# Patient Record
Sex: FEMALE | Race: BLACK OR AFRICAN AMERICAN | NOT HISPANIC OR LATINO | Employment: OTHER | URBAN - METROPOLITAN AREA
[De-identification: names, ages, dates, MRNs, and addresses within clinical notes are randomized per-mention and may not be internally consistent; named-entity substitution may affect disease eponyms.]

---

## 2018-07-07 ENCOUNTER — OFFICE VISIT (OUTPATIENT)
Dept: URGENT CARE | Facility: CLINIC | Age: 33
End: 2018-07-07
Payer: MEDICARE

## 2018-07-07 VITALS
TEMPERATURE: 100 F | HEART RATE: 102 BPM | SYSTOLIC BLOOD PRESSURE: 118 MMHG | OXYGEN SATURATION: 99 % | DIASTOLIC BLOOD PRESSURE: 71 MMHG

## 2018-07-07 DIAGNOSIS — L03.317 CELLULITIS OF BUTTOCK: Primary | ICD-10-CM

## 2018-07-07 PROCEDURE — 99203 OFFICE O/P NEW LOW 30 MIN: CPT | Mod: S$GLB,,, | Performed by: PHYSICIAN ASSISTANT

## 2018-07-07 RX ORDER — SULFAMETHOXAZOLE AND TRIMETHOPRIM 800; 160 MG/1; MG/1
1 TABLET ORAL 2 TIMES DAILY
Qty: 20 TABLET | Refills: 0 | Status: SHIPPED | OUTPATIENT
Start: 2018-07-07 | End: 2018-07-17

## 2018-07-07 NOTE — PATIENT INSTRUCTIONS
Discharge Instructions for Cellulitis  You have been diagnosed with cellulitis. This is an infection in the deepest layer of the skin. In some cases, the infection also affects the muscle. Cellulitis is caused by bacteria. The bacteria can enter the body through broken skin. This can happen with a cut, scratch, animal bite, or an insect bite that has been scratched. You may have been treated in the hospital with antibiotics and fluids. You will likely be given a prescription for antibiotics to take at home. This sheet will help you take care of yourself at home.  Home care  When you are home:  · Take the prescribed antibiotic medicine you are given as directed until it is gone. Take it even if you feel better. It treats the infection and stops it from returning. Not taking all the medicine can make future infections hard to treat.  · Keep the infected area clean.  · When possible, raise the infected area above the level of your heart. This helps keep swelling down.  · Talk with your healthcare provider if you are in pain. Ask what kind of over-the-counter medicine you can take for pain.  · Apply clean bandages as advised.  · Take your temperature once a day for a week.  · Wash your hands often to prevent spreading the infection.  In the future, wash your hands before and after you touch cuts, scratches, or bandages. This will help prevent infection.   When to call your healthcare provider  Call your healthcare provider immediately if you have any of the following:  · Difficulty or pain when moving the joints above or below the infected area  · Discharge or pus draining from the area  · Fever of 100.4°F (38°C) or higher, or as directed by your healthcare provider  · Pain that gets worse in or around the infected   · Redness that gets worse in or around the infected area, particularly if the area of redness expands to a wider area  · Shaking chills  · Swelling of the infected area  · Vomiting   Date Last Reviewed:  8/1/2016  © 9275-3187 [employer], [employer]. [address] [place] [address], [place] [address]. All rights reserved. This information is not intended as a substitute for professional medical care. Always follow your healthcare professional's instructions.      Please follow up with your Primary care provider within 2-5 days if your signs and symptoms have not resolved or worsen.     If your condition worsens or fails to improve we recommend that you receive another evaluation at the emergency room immediately or contact your primary medical clinic to discuss your concerns.   You must understand that you have received an Urgent Care treatment only and that you may be released before all of your medical problems are known or treated. You, the patient, will arrange for follow up care as instructed.     RED FLAGS/WARNING SYMPTOMS DISCUSSED WITH PATIENT THAT WOULD WARRANT EMERGENT MEDICAL ATTENTION. PATIENT VERBALIZED UNDERSTANDING.

## 2018-07-07 NOTE — PROGRESS NOTES
Subjective:       Patient ID: Faye Khalil is a 32 y.o. female.    Vitals:  oral temperature is 100.3 °F (37.9 °C). Her blood pressure is 118/71 and her pulse is 102. Her oxygen saturation is 99%.     Chief Complaint: Cellulitis    Edema   This is a new problem. The current episode started in the past 7 days. The problem occurs constantly. The problem has been gradually worsening. Pertinent negatives include no abdominal pain, anorexia, arthralgias, change in bowel habit, chest pain, chills, congestion, coughing, diaphoresis, fatigue, fever, headaches, joint swelling, myalgias, nausea, neck pain, numbness, rash, sore throat, swollen glands, urinary symptoms, vertigo, visual change, vomiting or weakness. The symptoms are aggravated by walking. She has tried nothing for the symptoms.     Review of Systems   Constitution: Negative for chills, diaphoresis, fatigue, fever and weakness.   HENT: Negative for congestion and sore throat.    Eyes: Negative for blurred vision.   Cardiovascular: Negative for chest pain.   Respiratory: Negative for cough and shortness of breath.    Skin: Positive for itching and suspicious lesions. Negative for rash.   Musculoskeletal: Negative for arthralgias, back pain, joint pain, joint swelling, myalgias and neck pain.   Gastrointestinal: Negative for abdominal pain, anorexia, change in bowel habit, diarrhea, nausea and vomiting.   Neurological: Negative for headaches, numbness and vertigo.   Psychiatric/Behavioral: The patient is not nervous/anxious.        Objective:      Physical Exam   Constitutional: She is oriented to person, place, and time. She appears well-developed and well-nourished. She is cooperative.  Non-toxic appearance. She does not appear ill. No distress.   HENT:   Head: Normocephalic and atraumatic. Head is without abrasion, without contusion and without laceration.   Right Ear: Hearing, tympanic membrane, external ear and ear canal normal.   Left Ear: Hearing,  tympanic membrane, external ear and ear canal normal.   Nose: Nose normal. No mucosal edema, rhinorrhea or nasal deformity. No epistaxis. Right sinus exhibits no maxillary sinus tenderness and no frontal sinus tenderness. Left sinus exhibits no maxillary sinus tenderness and no frontal sinus tenderness.   Mouth/Throat: Uvula is midline, oropharynx is clear and moist and mucous membranes are normal. No trismus in the jaw. Normal dentition. No uvula swelling. No posterior oropharyngeal erythema.   Eyes: Conjunctivae, EOM and lids are normal. Pupils are equal, round, and reactive to light. Right eye exhibits no discharge. Left eye exhibits no discharge. No scleral icterus.   Sclera clear bilat   Neck: Trachea normal, normal range of motion, full passive range of motion without pain and phonation normal. Neck supple.   Cardiovascular: Normal rate, regular rhythm, normal heart sounds, intact distal pulses and normal pulses.    Pulmonary/Chest: Effort normal and breath sounds normal. No stridor. No respiratory distress.   Abdominal: Soft. Normal appearance and bowel sounds are normal. She exhibits no distension, no pulsatile midline mass and no mass. There is no tenderness.   Musculoskeletal: Normal range of motion. She exhibits no edema or deformity.   Neurological: She is alert and oriented to person, place, and time. She exhibits normal muscle tone. Coordination normal.   Skin: Skin is warm, dry and intact. Capillary refill takes less than 2 seconds. No abrasion, no bruising, no burn, no ecchymosis, no laceration, no lesion, no petechiae and no rash noted. She is not diaphoretic. There is erythema. No pallor.        Area of erythema and warmth without induration    No oozing, weeping, or drainage.    Psychiatric: She has a normal mood and affect. Her speech is normal and behavior is normal. Judgment and thought content normal. Cognition and memory are normal.   Nursing note and vitals reviewed.      Assessment:        1. Cellulitis of buttock        Plan:         Cellulitis of buttock  -     sulfamethoxazole-trimethoprim 800-160mg (BACTRIM DS) 800-160 mg Tab; Take 1 tablet by mouth 2 (two) times daily. for 10 days  Dispense: 20 tablet; Refill: 0        Discharge Instructions for Cellulitis  You have been diagnosed with cellulitis. This is an infection in the deepest layer of the skin. In some cases, the infection also affects the muscle. Cellulitis is caused by bacteria. The bacteria can enter the body through broken skin. This can happen with a cut, scratch, animal bite, or an insect bite that has been scratched. You may have been treated in the hospital with antibiotics and fluids. You will likely be given a prescription for antibiotics to take at home. This sheet will help you take care of yourself at home.  Home care  When you are home:  · Take the prescribed antibiotic medicine you are given as directed until it is gone. Take it even if you feel better. It treats the infection and stops it from returning. Not taking all the medicine can make future infections hard to treat.  · Keep the infected area clean.  · When possible, raise the infected area above the level of your heart. This helps keep swelling down.  · Talk with your healthcare provider if you are in pain. Ask what kind of over-the-counter medicine you can take for pain.  · Apply clean bandages as advised.  · Take your temperature once a day for a week.  · Wash your hands often to prevent spreading the infection.  In the future, wash your hands before and after you touch cuts, scratches, or bandages. This will help prevent infection.   When to call your healthcare provider  Call your healthcare provider immediately if you have any of the following:  · Difficulty or pain when moving the joints above or below the infected area  · Discharge or pus draining from the area  · Fever of 100.4°F (38°C) or higher, or as directed by your healthcare provider  · Pain that gets  worse in or around the infected   · Redness that gets worse in or around the infected area, particularly if the area of redness expands to a wider area  · Shaking chills  · Swelling of the infected area  · Vomiting   Date Last Reviewed: 8/1/2016  © 5099-1855 [employer], [employer]. [address] [place] [address]. All rights reserved. This information is not intended as a substitute for professional medical care. Always follow your healthcare professional's instructions.      Please follow up with your Primary care provider within 2-5 days if your signs and symptoms have not resolved or worsen.     If your condition worsens or fails to improve we recommend that you receive another evaluation at the emergency room immediately or contact your primary medical clinic to discuss your concerns.   You must understand that you have received an Urgent Care treatment only and that you may be released before all of your medical problems are known or treated. You, the patient, will arrange for follow up care as instructed.     RED FLAGS/WARNING SYMPTOMS DISCUSSED WITH PATIENT THAT WOULD WARRANT EMERGENT MEDICAL ATTENTION. PATIENT VERBALIZED UNDERSTANDING.

## 2018-07-10 ENCOUNTER — TELEPHONE (OUTPATIENT)
Dept: URGENT CARE | Facility: CLINIC | Age: 33
End: 2018-07-10

## 2018-07-10 NOTE — TELEPHONE ENCOUNTER
Called patient, left message informing patient I was calling as a follow up in regards to her visit on 7/7/2018.

## 2019-04-18 ENCOUNTER — HOSPITAL ENCOUNTER (INPATIENT)
Facility: HOSPITAL | Age: 34
LOS: 18 days | Discharge: HOME HEALTH | DRG: 885 | End: 2019-05-06
Attending: PSYCHIATRY & NEUROLOGY | Admitting: PSYCHIATRY & NEUROLOGY
Payer: MEDICARE

## 2019-04-18 DIAGNOSIS — F20.0 SCHIZOPHRENIA, PARANOID, CHRONIC WITH ACUTE EXACERBATION: Primary | ICD-10-CM

## 2019-04-18 DIAGNOSIS — F20.9 SCHIZOPHRENIA, UNSPECIFIED TYPE: ICD-10-CM

## 2019-04-18 DIAGNOSIS — D50.9 MICROCYTIC ANEMIA: ICD-10-CM

## 2019-04-18 DIAGNOSIS — R00.0 TACHYCARDIA: ICD-10-CM

## 2019-04-18 DIAGNOSIS — F20.9 SCHIZOPHRENIA, CHRONIC CONDITION: Chronic | ICD-10-CM

## 2019-04-18 DIAGNOSIS — F20.9 SCHIZOPHRENIA, CHRONIC CONDITION WITH ACUTE EXACERBATION: ICD-10-CM

## 2019-04-18 LAB
CHOLEST SERPL-MCNC: 133 MG/DL (ref 120–199)
CHOLEST/HDLC SERPL: 2.6 {RATIO} (ref 2–5)
FOLATE SERPL-MCNC: 13.8 NG/ML (ref 4–24)
HDLC SERPL-MCNC: 51 MG/DL (ref 40–75)
HDLC SERPL: 38.3 % (ref 20–50)
HIV1+2 IGG SERPL QL IA.RAPID: NEGATIVE
LDLC SERPL CALC-MCNC: 74.8 MG/DL (ref 63–159)
NONHDLC SERPL-MCNC: 82 MG/DL
RPR SER QL: NORMAL
T3 SERPL-MCNC: 69 NG/DL (ref 60–180)
T4 FREE SERPL-MCNC: 1.01 NG/DL (ref 0.71–1.51)
TRIGL SERPL-MCNC: 36 MG/DL (ref 30–150)
VIT B12 SERPL-MCNC: 816 PG/ML (ref 210–950)

## 2019-04-18 PROCEDURE — 93010 EKG 12-LEAD: ICD-10-PCS | Mod: ,,, | Performed by: INTERNAL MEDICINE

## 2019-04-18 PROCEDURE — 93005 ELECTROCARDIOGRAM TRACING: CPT

## 2019-04-18 PROCEDURE — 99223 1ST HOSP IP/OBS HIGH 75: CPT | Mod: AI,GC,, | Performed by: PSYCHIATRY & NEUROLOGY

## 2019-04-18 PROCEDURE — 93010 ELECTROCARDIOGRAM REPORT: CPT | Mod: ,,, | Performed by: INTERNAL MEDICINE

## 2019-04-18 PROCEDURE — 82728 ASSAY OF FERRITIN: CPT

## 2019-04-18 PROCEDURE — 90853 PR GROUP PSYCHOTHERAPY: ICD-10-PCS | Mod: ,,, | Performed by: PSYCHOLOGIST

## 2019-04-18 PROCEDURE — 86703 HIV-1/HIV-2 1 RESULT ANTBDY: CPT

## 2019-04-18 PROCEDURE — 84439 ASSAY OF FREE THYROXINE: CPT

## 2019-04-18 PROCEDURE — 25000003 PHARM REV CODE 250: Performed by: STUDENT IN AN ORGANIZED HEALTH CARE EDUCATION/TRAINING PROGRAM

## 2019-04-18 PROCEDURE — 99223 PR INITIAL HOSPITAL CARE,LEVL III: ICD-10-PCS | Mod: AI,GC,, | Performed by: PSYCHIATRY & NEUROLOGY

## 2019-04-18 PROCEDURE — 82746 ASSAY OF FOLIC ACID SERUM: CPT

## 2019-04-18 PROCEDURE — 82607 VITAMIN B-12: CPT

## 2019-04-18 PROCEDURE — 12400001 HC PSYCH SEMI-PRIVATE ROOM

## 2019-04-18 PROCEDURE — 90853 GROUP PSYCHOTHERAPY: CPT | Mod: ,,, | Performed by: PSYCHOLOGIST

## 2019-04-18 PROCEDURE — 84480 ASSAY TRIIODOTHYRONINE (T3): CPT

## 2019-04-18 PROCEDURE — 86592 SYPHILIS TEST NON-TREP QUAL: CPT

## 2019-04-18 PROCEDURE — 36415 COLL VENOUS BLD VENIPUNCTURE: CPT

## 2019-04-18 PROCEDURE — 83540 ASSAY OF IRON: CPT

## 2019-04-18 PROCEDURE — 80061 LIPID PANEL: CPT

## 2019-04-18 RX ORDER — PALIPERIDONE 3 MG/1
6 TABLET, EXTENDED RELEASE ORAL DAILY
Status: DISCONTINUED | OUTPATIENT
Start: 2019-04-18 | End: 2019-04-19

## 2019-04-18 RX ORDER — FOLIC ACID 1 MG/1
1 TABLET ORAL DAILY
Status: DISCONTINUED | OUTPATIENT
Start: 2019-04-18 | End: 2019-05-06 | Stop reason: HOSPADM

## 2019-04-18 RX ORDER — NICOTINE 7MG/24HR
1 PATCH, TRANSDERMAL 24 HOURS TRANSDERMAL DAILY PRN
Status: DISCONTINUED | OUTPATIENT
Start: 2019-04-18 | End: 2019-05-06 | Stop reason: HOSPADM

## 2019-04-18 RX ORDER — OLANZAPINE 10 MG/2ML
10 INJECTION, POWDER, FOR SOLUTION INTRAMUSCULAR 3 TIMES DAILY PRN
Status: DISCONTINUED | OUTPATIENT
Start: 2019-04-18 | End: 2019-05-06 | Stop reason: HOSPADM

## 2019-04-18 RX ORDER — FERROUS SULFATE 325(65) MG
325 TABLET, DELAYED RELEASE (ENTERIC COATED) ORAL DAILY
Status: DISCONTINUED | OUTPATIENT
Start: 2019-04-18 | End: 2019-05-01

## 2019-04-18 RX ORDER — LOPERAMIDE HYDROCHLORIDE 2 MG/1
2 CAPSULE ORAL
Status: DISCONTINUED | OUTPATIENT
Start: 2019-04-18 | End: 2019-05-06 | Stop reason: HOSPADM

## 2019-04-18 RX ORDER — OLANZAPINE 10 MG/1
10 TABLET ORAL 3 TIMES DAILY PRN
Status: DISCONTINUED | OUTPATIENT
Start: 2019-04-18 | End: 2019-05-06 | Stop reason: HOSPADM

## 2019-04-18 RX ORDER — IBUPROFEN 400 MG/1
400 TABLET ORAL EVERY 6 HOURS PRN
Status: DISCONTINUED | OUTPATIENT
Start: 2019-04-18 | End: 2019-05-06 | Stop reason: HOSPADM

## 2019-04-18 RX ORDER — DOCUSATE SODIUM 100 MG/1
100 CAPSULE, LIQUID FILLED ORAL DAILY PRN
Status: DISCONTINUED | OUTPATIENT
Start: 2019-04-18 | End: 2019-05-06 | Stop reason: HOSPADM

## 2019-04-18 RX ADMIN — THERA TABS 1 TABLET: TAB at 08:04

## 2019-04-18 RX ADMIN — FOLIC ACID 1 MG: 1 TABLET ORAL at 08:04

## 2019-04-18 RX ADMIN — FERROUS SULFATE TAB EC 325 MG (65 MG FE EQUIVALENT) 325 MG: 325 (65 FE) TABLET DELAYED RESPONSE at 08:04

## 2019-04-18 RX ADMIN — OLANZAPINE 10 MG: 10 TABLET, FILM COATED ORAL at 08:04

## 2019-04-18 RX ADMIN — PALIPERIDONE 6 MG: 3 TABLET, EXTENDED RELEASE ORAL at 08:04

## 2019-04-18 NOTE — PROGRESS NOTES
04/18/19 1400   [facility]   Group Name Therapeutic Recreation   Participation Quality Refused;Withdrawn   Insight/Motivation Limited   Affect/Mood Display Irritable;Restless   Cognition Delusional

## 2019-04-18 NOTE — NURSING
Received message from Francisco ([phone])with [facility]. He wanted to discuss pt's admission and their interaction with patient for continuity of care.    Returned call and message left.
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
## 2019-04-18 NOTE — PLAN OF CARE
Problem: Adult Behavioral Health Plan of Care  Goal: Plan of Care Review  Outcome: Ongoing (interventions implemented as appropriate)  Patient up ambulating in hallway. Patient denies any discomfort or distress. Will continue to monitor.
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
## 2019-04-18 NOTE — ASSESSMENT & PLAN NOTE
- will restart supplemental iron  -Ferritin, TIBC, and serum iron; added on to previous labs from 4/18 follow up

## 2019-04-18 NOTE — SUBJECTIVE & OBJECTIVE
"Interval History: Please see hospital course     Family History     Problem Relation (Age of Onset)    Breast cancer Maternal Aunt    Cancer Maternal Uncle        Tobacco Use    Smoking status: Never Smoker   Substance and Sexual Activity    Alcohol use: No    Drug use: Not on file    Sexual activity: Never     Birth control/protection: Surgical     Psychotherapeutics (From admission, onward)    Start     Stop Route Frequency Ordered    04/18/19 0900  paliperidone 24 hr tablet 6 mg      -- Oral Daily 04/18/19 0421 04/18/19 0421  OLANZapine tablet 10 mg  (Olanzapine)      -- Oral 3 times daily PRN 04/18/19 0421 04/18/19 0421  OLANZapine injection 10 mg  (Olanzapine)      -- IM 3 times daily PRN 04/18/19 0421           Review of Systems  Objective:     Vital Signs (Most Recent):  Temp: 98.7 °F (37.1 °C) (04/18/19 0726)  Pulse: 97 (04/18/19 0726)  Resp: 17 (04/18/19 0726)  BP: (!) 116/58 (04/18/19 0726) Vital Signs (24h Range):  Temp:  [98.4 °F (36.9 °C)-99.5 °F (37.5 °C)] 98.7 °F (37.1 °C)  Pulse:  [] 97  Resp:  [16-18] 17  SpO2:  [98 %-100 %] 100 %  BP: (115-124)/(58-86) 116/58     Height: 5' 6" (167.6 cm)  Weight: 63 kg (138 lb 14.2 oz)  Body mass index is 22.42 kg/m².    No intake or output data in the 24 hours ending 04/18/19 1008    Physical Exam   Psychiatric:   Mental Status Exam:    Appearance: dressed in hospital scrubs, hair visibly missing from wig, disheveled, age appropriate   Behavior/Cooperation: psychomotor agitation, tics, cooperative   Speech: Pressured  Language: uses words appropriately  Mood: "I need to go home"  Affect: labile   Thought Process: perseverative, thought blocking   Thought Content: no suicidality, no homicidality, delusions, or paranoia; +RIS  Level of Consciousness: Alert and Oriented x3  Memory:  Recent impaired   Attention/concentration: Easily distracted   Fund of Knowledge: appears adequate  Insight:  Limited  Judgment: Limited          Significant Labs:   Last " 24 Hours:   Recent Lab Results     None          Significant Imaging: None
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
## 2019-04-18 NOTE — PROGRESS NOTES
Group Psychotherapy (PhD/LCSW)    Site: [facility]    Clinical status of patient: Inpatient    Date: 4/18/2019    Group Focus: Life Skills    Length of service: [id number] - 35-40 minutes    Number of patients in attendance: 6    Referred by: Acute Psychiatry Unit Treatment Team    Target symptoms: Psychosis    Patient's response to treatment: Self-disclosure, intrusive     Progress toward goals: Progressing slowly    Interval History: Pt tended to be intrusive with her own issues and needed limits. Comments were tangential and somewhat disorganized.    Diagnosis:  Schizophrenia    Plan: Continue treatment on APU
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
## 2019-04-18 NOTE — SUBJECTIVE & OBJECTIVE
Patient History           Medical as of 4/18/2019     Past Medical History     Diagnosis Date Comments Source    Anemia -- -- Provider    Mental disorder -- -- Provider    Schizophrenia -- -- Provider                  Surgical as of 4/18/2019     Past Surgical History     Procedure Laterality Date Comments Source    TUBAL LIGATION -- -- -- Provider                  Family as of 4/18/2019     Problem Relation Name Age of Onset Comments Source    Breast cancer Maternal Aunt -- -- -- Provider    Cancer Maternal Uncle -- -- -- Provider    Ovarian cancer Neg Hx -- -- -- Provider    Hypertension Neg Hx -- -- -- Provider    Diabetes Neg Hx -- -- -- Provider            Tobacco Use as of 4/18/2019     Smoking Status Smoking Start Date Smoking Quit Date Packs/Day Years Used    Never Smoker -- -- -- --    Types Comments Smokeless Tobacco Status Smokeless Tobacco Quit Date Source    -- -- Unknown -- Provider            Alcohol Use as of 4/18/2019     Alcohol Use Drinks/Week Alcohol/Week Comments Source    No -- -- -- Provider    Frequency Standard Drinks Binge Drinking        -- -- --              Drug Use as of 4/18/2019     Drug Use Types Frequency Comments Source    -- -- -- -- Provider            Sexual Activity as of 4/18/2019     Sexually Active Birth Control Partners Comments Source    Never Surgical -- -- Provider            Activities of Daily Living as of 4/18/2019    None           Social Documentation as of 4/18/2019    None           Occupational as of 4/18/2019    None           Socioeconomic as of 4/18/2019     Marital Status Spouse Name Number of Children Years Education Education Level Preferred Language Ethnicity Race Source    Single -- -- -- -- English /Black Black or  --    Financial Resource Strain Food Insecurity: Worry Food Insecurity: Inability Transportation Needs: Medical Transportation Needs: Non-medical    -- -- -- -- --            Pertinent History     Question  Response Comments    Lives with -- --    Place in Birth Order -- --    Lives in -- --    Number of Siblings -- --    Raised by -- --    Legal Involvement -- --    Childhood Trauma -- --    Criminal History of -- --    Financial Status -- --    Highest Level of Education -- --    Does patient have access to a firearm? -- --     Service -- --    Primary Leisure Activity -- --    Spirituality -- --        Past Medical History:   Diagnosis Date    Anemia     Mental disorder     Schizophrenia      Past Surgical History:   Procedure Laterality Date    LIGATION, FALLOPIAN TUBE, LAPAROSCOPIC Bilateral 5/8/2014    Performed by Lissy [facility]rena [facility] at [facility], [place], operated by [facility] OR    TUBAL LIGATION       Family History     Problem Relation (Age of Onset)    Breast cancer Maternal Aunt    Cancer Maternal Uncle        Tobacco Use    Smoking status: Never Smoker   Substance and Sexual Activity    Alcohol use: No    Drug use: Not on file    Sexual activity: Never     Birth control/protection: Surgical     Review of patient's allergies indicates:  No Known Allergies    No current facility-administered medications on file prior to encounter.      Current Outpatient Medications on File Prior to Encounter   Medication Sig    benztropine (COGENTIN) 1 MG tablet Take 1 mg by mouth every evening.     ferrous sulfate 324 mg (65 mg iron) TbEC Take 325 mg by mouth once daily.    INVEGA SUSTENNA 117 mg/0.75 mL Syrg every 30 days.     naproxen sodium (ANAPROX) 550 MG tablet Take 1 tablet (550 mg total) by mouth every 12 (twelve) hours as needed.    paroxetine (PAXIL) 10 MG tablet Take 10 mg by mouth every evening.      Psychotherapeutics (From admission, onward)    None        Review of Systems  Strengths and Liabilities: Liability: Patient has poor judgment, Liability: Patient is unstable.    Objective:     Vital Signs (Most Recent):    Vital Signs (24h Range):  Temp:  [99.5 °F (37.5 °C)] 99.5 °F (37.5 °C)  Pulse:  [102] 102  Resp:  [16]  16  SpO2:  [98 %] 98 %  BP: (124)/(86) 124/86           There is no height or weight on file to calculate BMI.    No intake or output data in the 24 hours ending 04/18/19 0240    Physical Exam   Psychiatric:   Mental Status Exam:  Appearance:  unremarkable, age appropriate, disheveled  Behavior:       uncooperative, psychomotor agitation, redirectable  Speech/Language:    increased latency of response  Mood: anxious  Affect: Easily distracted   Thought Process:     flight of ideas, poverty of thought, tangential  Thought Content:      normal, no suicidality, no homicidality, delusions, (+) paranoia  Orientation:    grossly intact  Cognition:      grossly intact  Insight:           poor  Judgment:      poor            Significant Labs:   Last 24 Hours:   Recent Lab Results       04/18/19  0124   04/18/19  0123   04/17/19  2341        Benzodiazepines Negative         Methadone metabolites Negative         Phencyclidine Negative         Acetaminophen (Tylenol), Serum     <3.0  Comment:  Toxic Levels:  Adults (4 hr post-ingestion).........>150 ug/mL  Adults (12 hr post-ingestion)........>40 ug/mL  Peds (2 hr post-ingestion, liquid)...>225 ug/mL       Albumin     3.9     Alcohol, Medical, Serum     <10     Alkaline Phosphatase     60     ALT     12     Amphetamine Screen, Ur Negative         Anion Gap     10     Appearance, UA Clear         AST     19     Barbiturate Screen, Ur Negative         Baso #     0.03     Basophil%     0.5     Bilirubin (UA) Negative         BILIRUBIN TOTAL     0.8  Comment:  For infants and newborns, interpretation of results should be based  on gestational age, weight and in agreement with clinical  observations.  Premature Infant recommended reference ranges:  Up to 24 hours.............<8.0 mg/dL  Up to 48 hours............<12.0 mg/dL  3-5 days..................<15.0 mg/dL  6-29 days.................<15.0 mg/dL       BUN, Bld     10     Calcium     9.5     Chloride     104     CO2     24      Cocaine (Metab.) Negative         Color, UA Straw         Creatinine     0.7     Creatinine, Random Ur 34.0  Comment:  The random urine reference ranges provided were established   for 24 hour urine collections.  No reference ranges exist for  random urine specimens.  Correlate clinically.           Differential Method     Automated     eGFR if      >60.0     eGFR if non      >60.0  Comment:  Calculation used to obtain the estimated glomerular filtration  rate (eGFR) is the CKD-EPI equation.        Eos #     0.0     Eosinophil%     0.3     Glucose     83     Glucose, UA Negative         Gran # (ANC)     3.6     Gran%     54.3     Hematocrit     29.5     Hemoglobin     9.5     Immature Grans (Abs)     0.02  Comment:  Mild elevation in immature granulocytes is non specific and   can be seen in a variety of conditions including stress response,   acute inflammation, trauma and pregnancy. Correlation with other   laboratory and clinical findings is essential.       Immature Granulocytes     0.3     Ketones, UA 1+         Leukocytes, UA Negative         Lymph #     2.2     Lymph%     33.0     MCH     26.1     MCHC     32.2     MCV     81     Microscopic Comment SEE COMMENT  Comment:  Other formed elements not mentioned in the report are not   present in the microscopic examination.            Mono #     0.8     Mono%     11.6     MPV     10.7     Nitrite, UA Negative         nRBC     0     Occult Blood UA 1+         Opiate Scrn, Ur Negative         pH, UA 6.0         Platelets     298     Potassium     3.5     Preg Test, Ur   Negative       PROTEIN TOTAL     7.2     Protein, UA Negative  Comment:  Recommend a 24 hour urine protein or a urine   protein/creatinine ratio if globulin induced proteinuria is  clinically suspected.            Acceptable   Yes       RBC     3.64     RBC, UA 7         RDW     14.5     Sodium     138     Specific [place], UA 1.005         Specimen UA  Urine, Clean Catch         Squam Epithel, UA 0         Marijuana (THC) Metabolite Negative         Toxicology Information SEE COMMENT  Comment:  This screen includes the following classes of drugs at the   listed cut-off:  Benzodiazepines                  200 ng/ml  Methadone                        300 ng/ml  Cocaine metabolite               300 ng/ml  Opiates                          300 ng/ml  Barbiturates                     200 ng/ml  Amphetamines                    1000 ng/ml  Marijuana metabs (THC)            50 ng/ml  Phencyclidine (PCP)               25 ng/ml  High concentrations of Diphenhydramine may cross-react with  Phencyclidine PCP screening immunoassay giving a false   positive result.  High concentrations of Methylenedioxymethamphetamine (MDMA aka  Ectasy) and other structurally similar compounds may cross-   react with the Amphetamine/Methamphetamine screening   immunoassay giving a false positive result.  A metabolite of the anti-HIV drug Sustiva () may cause  false positive results in the Marijuana metabolite (THC)   screening assay.  Note: This exception list includes only more common   interferants in toxicology screen testing.  Because of many   cross-reactantspositive results on toxicology drug screens   should be confirmed whenever results do not correlate with   clinical presentation.  This report is intended for use in clinical monitoring and  management of patients. It is not intended for use in   employment related drug testing.  Because of any cross-reactants, positive results on toxicology  drug screens should be confirmed whenever results do not  correlate with clinical presentation.  Presumptive positive results are unconfirmed and may be used   only for medical purposes.  Assay Intended Use: This asasy provides only a preliminary analytical  test result. A more specific alternate chemical method must be used  to obtain a confirmed analytical result. Gas  chromatography/mass  spectrometry (GS/MS)is the preferred confirmatory method. Clinical  consideration and professional judgement should be applied to any   drug of abuse test result, particularly when preliminary results  are used.           TSH     0.713     WBC, UA 0         WBC     6.57           Significant Imaging: None

## 2019-04-18 NOTE — PLAN OF CARE
04/18/19 1300   [facility] Group Therapy   Group Name Medication   Participation Level Appropriate   Participation Quality Cooperative   Insight/Motivation Limited   Affect/Mood Display Appropriate   Cognition Alert

## 2019-04-19 LAB
FERRITIN SERPL-MCNC: 28 NG/ML (ref 20–300)
IRON SERPL-MCNC: 48 UG/DL (ref 30–160)
SATURATED IRON: 12 % (ref 20–50)
TOTAL IRON BINDING CAPACITY: 389 UG/DL (ref 250–450)
TRANSFERRIN SERPL-MCNC: 263 MG/DL (ref 200–375)

## 2019-04-19 PROCEDURE — 12400001 HC PSYCH SEMI-PRIVATE ROOM

## 2019-04-19 PROCEDURE — 99233 PR SUBSEQUENT HOSPITAL CARE,LEVL III: ICD-10-PCS | Mod: ,,, | Performed by: PSYCHIATRY & NEUROLOGY

## 2019-04-19 PROCEDURE — 99233 SBSQ HOSP IP/OBS HIGH 50: CPT | Mod: ,,, | Performed by: PSYCHIATRY & NEUROLOGY

## 2019-04-19 PROCEDURE — 25000003 PHARM REV CODE 250: Performed by: STUDENT IN AN ORGANIZED HEALTH CARE EDUCATION/TRAINING PROGRAM

## 2019-04-19 RX ORDER — PALIPERIDONE 3 MG/1
9 TABLET, EXTENDED RELEASE ORAL DAILY
Status: DISCONTINUED | OUTPATIENT
Start: 2019-04-20 | End: 2019-04-23

## 2019-04-19 RX ADMIN — FERROUS SULFATE TAB EC 325 MG (65 MG FE EQUIVALENT) 325 MG: 325 (65 FE) TABLET DELAYED RESPONSE at 08:04

## 2019-04-19 RX ADMIN — FOLIC ACID 1 MG: 1 TABLET ORAL at 08:04

## 2019-04-19 RX ADMIN — PALIPERIDONE 6 MG: 3 TABLET, EXTENDED RELEASE ORAL at 08:04

## 2019-04-19 RX ADMIN — THERA TABS 1 TABLET: TAB at 08:04

## 2019-04-19 NOTE — PLAN OF CARE
Problem: Adult Behavioral Health Plan of Care  Goal: Plan of Care Review  Outcome: Ongoing (interventions implemented as appropriate)  Patient sitting up in day room. Patient alert and oriented. Patient denies any auditory or visual hallucinations. Patient safety maintained. Will continue to monitor.

## 2019-04-19 NOTE — PROGRESS NOTES
04/19/19 [address] [place] [address] Group Therapy   Group Name Therapeutic Recreation   Participation Quality Requires Prompting   Insight/Motivation Limited;None   Affect/Mood Display Restless;Bizarre   Cognition Delusional;RIS
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
## 2019-04-19 NOTE — PLAN OF CARE
Problem: Adult Behavioral Health Plan of Care  Goal: Plan of Care Review  Outcome: Ongoing (interventions implemented as appropriate)  Observed awake and alert. Affect flat, mood anxious. Pt continues to perseverate about concerns involving her son's living condition. Pt states she must be discharged to find housing for her and her child. Overheard in bathroom RIS. Denied SI/HI. Pt. became agitated earlier in the night stating staff did not need to come into her room that often to check on her. Educated on the importance of providing a safe environment for all patients. Appeared asleep the remainder of the night as noted during frequent rounds. Free from falls/injury. Safety maintained. Continue to monitor.

## 2019-04-20 PROCEDURE — 12400001 HC PSYCH SEMI-PRIVATE ROOM

## 2019-04-20 PROCEDURE — 25000003 PHARM REV CODE 250: Performed by: STUDENT IN AN ORGANIZED HEALTH CARE EDUCATION/TRAINING PROGRAM

## 2019-04-20 PROCEDURE — 99232 PR SUBSEQUENT HOSPITAL CARE,LEVL II: ICD-10-PCS | Mod: ,,, | Performed by: PSYCHIATRY & NEUROLOGY

## 2019-04-20 PROCEDURE — 99232 SBSQ HOSP IP/OBS MODERATE 35: CPT | Mod: ,,, | Performed by: PSYCHIATRY & NEUROLOGY

## 2019-04-20 RX ADMIN — PALIPERIDONE 9 MG: 3 TABLET, EXTENDED RELEASE ORAL at 08:04

## 2019-04-20 RX ADMIN — THERA TABS 1 TABLET: TAB at 08:04

## 2019-04-20 RX ADMIN — FOLIC ACID 1 MG: 1 TABLET ORAL at 08:04

## 2019-04-20 RX ADMIN — FERROUS SULFATE TAB EC 325 MG (65 MG FE EQUIVALENT) 325 MG: 325 (65 FE) TABLET DELAYED RESPONSE at 08:04

## 2019-04-20 NOTE — PLAN OF CARE
"Problem: Cognitive Impairment (Psychotic Signs/Symptoms)  Goal: Improved Thought Clarity and Organization (Psychotic Signs/Symptoms)  Outcome: Ongoing (interventions implemented as appropriate)  Patient thought processes are loose and disorganized.  She reports that she does not have schizophrenia and she does not know why she is here.  Verbalizes that she is worried about her son.  She denies audio hallucinations.  Stating " I have nevers heard voices I don't know why you ask me that:"  Patient States she is sleeping and eating well.      Problem: Sleep Impairment (Psychotic Signs/Symptoms)  Goal: Improved Sleep Hygiene (Psychotic Signs/Symptoms)  Outcome: Ongoing (interventions implemented as appropriate)  Patient reports she is sleeping well.        "

## 2019-04-20 NOTE — PLAN OF CARE
Pt calm and cooperative, although appears disorganized and day-dreamy. Medication compliant. Attended unit activities. Frequently asked this RN about being discharged to be with her special needs son and also requested that this RN help her find housing for herself and son. Pt was instructed to speak with SW re: housing needs. Denies SI/HI/AH/VH. No physical complaints voiced.

## 2019-04-20 NOTE — SUBJECTIVE & OBJECTIVE
"Interval History: Please see hospital course     Family History     Problem Relation (Age of Onset)    Breast cancer Maternal Aunt    Cancer Maternal Uncle        Tobacco Use    Smoking status: Never Smoker   Substance and Sexual Activity    Alcohol use: No    Drug use: Not on file    Sexual activity: Never     Birth control/protection: Surgical     Psychotherapeutics (From admission, onward)    Start     Stop Route Frequency Ordered    04/20/19 0900  paliperidone 24 hr tablet 9 mg      -- Oral Daily 04/19/19 0932    04/18/19 0421  OLANZapine tablet 10 mg  (Olanzapine)      -- Oral 3 times daily PRN 04/18/19 0421    04/18/19 0421  OLANZapine injection 10 mg  (Olanzapine)      -- IM 3 times daily PRN 04/18/19 0421           Review of Systems    Objective:     Vital Signs (Most Recent):  Temp: 98.3 °F (36.8 °C) (04/20/19 0800)  Pulse: 104 (04/20/19 0800)  Resp: 18 (04/20/19 0800)  BP: 114/66 (04/20/19 0800) Vital Signs (24h Range):  Temp:  [98.3 °F (36.8 °C)] 98.3 °F (36.8 °C)  Pulse:  [] 104  Resp:  [18] 18  BP: (114-117)/(58-66) 114/66     Height: 5' 6" (167.6 cm)  Weight: 63 kg (138 lb 14.2 oz)  Body mass index is 22.42 kg/m².    No intake or output data in the 24 hours ending 04/20/19 0953    Physical Exam   Psychiatric:   Mental Status Exam:    Appearance: dressed in street clothes, hair visibly missing from wig, disheveled, age appropriate   Behavior/Cooperation: psychomotor agitation, tics, cooperative   Speech: Pressured  Language: uses words appropriately  Mood: Euthymic  Affect: labile   Thought Process: perseverative, thought blocking   Thought Content: no suicidality, no homicidality; +RIS, Delusions regarding son's safety  Level of Consciousness: Alert and Oriented x3  Memory:  Recent impaired   Attention/concentration: Easily distracted   Fund of Knowledge: appears adequate  Insight:  Poor  Judgment: Limited            Significant Labs:   Last 24 Hours:   Recent Lab Results     None    "       Significant Imaging: None

## 2019-04-20 NOTE — PROGRESS NOTES
04/20/19 0900 04/20/19 1000 04/20/19 1100   [facility] Group Therapy   Group Name Community Reintegration Stress Management Stress Management   Specific Interventions Current Events Coping Skills Training Guided Imagery/Relaxation   Participation Level Active;Supportive;Appropriate Active;Supportive;Appropriate Active;Supportive;Appropriate   Participation Quality Cooperative;Social Cooperative;Social Cooperative   Insight/Motivation Improved Good Good   Affect/Mood Display Appropriate;Bizarre Appropriate Appropriate;Bizarre   Cognition Alert;Oriented Alert;Oriented Alert;Oriented      04/20/19 1300   [facility] Group Therapy   Group Name Therapeutic Recreation   Specific Interventions Skilled Activity Creative Expression   Participation Level Active;Supportive;Appropriate   Participation Quality Cooperative;Social   Insight/Motivation Good   Affect/Mood Display Appropriate;Bizarre   Cognition Alert;Oriented

## 2019-04-20 NOTE — PROGRESS NOTES
04/20/19 1600   [facility] Group Therapy   Group Name Education   Specific Interventions Remotivation   Participation Level Active;Supportive;Appropriate   Participation Quality Cooperative   Insight/Motivation Good   Affect/Mood Display Appropriate   Cognition Alert

## 2019-04-20 NOTE — NURSING
Patient is focused on discharge and seeing her son.  She has poor insight into illness and aftercare need.  Told writer she is not schizophrenic that she does not know why she is here.  Patient denies audio/visual hallucinations.  She report that she takes Invega but does not know why.  She reports she is eating and sleeping well.

## 2019-04-21 PROCEDURE — 25000003 PHARM REV CODE 250: Performed by: STUDENT IN AN ORGANIZED HEALTH CARE EDUCATION/TRAINING PROGRAM

## 2019-04-21 PROCEDURE — 12400001 HC PSYCH SEMI-PRIVATE ROOM

## 2019-04-21 PROCEDURE — 99232 PR SUBSEQUENT HOSPITAL CARE,LEVL II: ICD-10-PCS | Mod: ,,, | Performed by: PSYCHIATRY & NEUROLOGY

## 2019-04-21 PROCEDURE — 99232 SBSQ HOSP IP/OBS MODERATE 35: CPT | Mod: ,,, | Performed by: PSYCHIATRY & NEUROLOGY

## 2019-04-21 RX ADMIN — FERROUS SULFATE TAB EC 325 MG (65 MG FE EQUIVALENT) 325 MG: 325 (65 FE) TABLET DELAYED RESPONSE at 08:04

## 2019-04-21 RX ADMIN — THERA TABS 1 TABLET: TAB at 08:04

## 2019-04-21 RX ADMIN — FOLIC ACID 1 MG: 1 TABLET ORAL at 08:04

## 2019-04-21 RX ADMIN — PALIPERIDONE 9 MG: 3 TABLET, EXTENDED RELEASE ORAL at 08:04
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
## 2019-04-21 NOTE — PROGRESS NOTES
04/21/19 0900 04/21/19 1000 04/21/19 1100   [facility] Group Therapy   Group Name Community Reintegration Education Stress Management   Specific Interventions Current Events Skilled Activity Leisure Education and Awareness Guided Imagery/Relaxation   Participation Level Active;Appropriate Appropriate;Active Appropriate   Participation Quality Cooperative Cooperative Cooperative   Insight/Motivation Good Good Good   Affect/Mood Display Appropriate Appropriate Appropriate   Cognition Alert Alert Alert      04/21/19 1300   [facility] Group Therapy   Group Name Therapeutic Recreation   Specific Interventions Skilled Activity Mild Exercises   Participation Level None   Participation Quality Refused   Insight/Motivation  --    Affect/Mood Display  --    Cognition  --

## 2019-04-21 NOTE — SUBJECTIVE & OBJECTIVE
"Interval History: see hospital course    Family History     Problem Relation (Age of Onset)    Breast cancer Maternal Aunt    Cancer Maternal Uncle        Tobacco Use    Smoking status: Never Smoker   Substance and Sexual Activity    Alcohol use: No    Drug use: Not on file    Sexual activity: Never     Birth control/protection: Surgical     Psychotherapeutics (From admission, onward)    Start     Stop Route Frequency Ordered    04/20/19 0900  paliperidone 24 hr tablet 9 mg      -- Oral Daily 04/19/19 0932    04/18/19 0421  OLANZapine tablet 10 mg  (Olanzapine)      -- Oral 3 times daily PRN 04/18/19 0421    04/18/19 0421  OLANZapine injection 10 mg  (Olanzapine)      -- IM 3 times daily PRN 04/18/19 0421           Review of Systems   Constitutional: Negative for fever.   HENT: Negative for congestion.      Objective:     Vital Signs (Most Recent):  Temp: 98 °F (36.7 °C) (04/21/19 0739)  Pulse: 61 (04/21/19 0739)  Resp: 16 (04/21/19 0739)  BP: 112/71 (04/21/19 0739) Vital Signs (24h Range):  Temp:  [98 °F (36.7 °C)-98.3 °F (36.8 °C)] 98 °F (36.7 °C)  Pulse:  [61-76] 61  Resp:  [16] 16  BP: (105-112)/(63-71) 112/71     Height: 5' 6" (167.6 cm)  Weight: 63 kg (138 lb 14.2 oz)  Body mass index is 22.42 kg/m².    No intake or output data in the 24 hours ending 04/21/19 1340    Physical Exam      CONSTITUTIONAL  General Appearance: unremarkable, age appropriate     MUSCULOSKELETAL  Muscle Strength and Tone: WNL  Abnormal Involuntary Movements: none noted  Gait and Station: normal gait     PSYCHIATRIC   Appearance: unremarkable, age appropriate  Grooming: fair  Arousal: alert  Behavior/Cooperation: cooperative  Speech: normal r/t/v  Language: appropriate   Mood: euthymic  Affect: constricted  Thought Process: linear to questioning  Thought Content: denies SI/HI/AVH  Associations: no loose associations noted  Orientation: grossly intact  Memory: Grossly intact  Fund of Knowledge: appropriate for education " level  Attention Span/Concentration: Normal  Cognition: grossly intact  Insight: fair  Judgment: fair    Significant Labs:   Last 24 Hours:   Recent Lab Results     None          Significant Imaging: I have reviewed all pertinent imaging results/findings within the past 24 hours.

## 2019-04-21 NOTE — PROGRESS NOTES
04/21/19 1400   [facility] Group Therapy   Group Name Self-Esteem   Specific Interventions Guided Imagery/Relaxation   Participation Level Minimal   Participation Quality Cooperative;Withdrawn   Insight/Motivation Limited   Affect/Mood Display Bizarre   Cognition Alert
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
## 2019-04-21 NOTE — PLAN OF CARE
Problem: Adult Behavioral Health Plan of Care  Goal: Plan of Care Review  Outcome: Ongoing (interventions implemented as appropriate)  Observed awake and alert. Affect flat, mood calm. Pt denied SI/HI, observed RIS. Pt's less agitated than noted in previous days. Appeared asleep throughout the night as noted during frequent rounds. Free from falls/injury. Safety maintained. Continue to monitor.
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
## 2019-04-21 NOTE — NURSING
Pt has been up out of her room most of the day. She is attending groups and participates. Pt thoughts are still delusional and disorganized at times.

## 2019-04-22 PROCEDURE — 25000003 PHARM REV CODE 250: Performed by: STUDENT IN AN ORGANIZED HEALTH CARE EDUCATION/TRAINING PROGRAM

## 2019-04-22 PROCEDURE — 12400001 HC PSYCH SEMI-PRIVATE ROOM

## 2019-04-22 PROCEDURE — 99232 PR SUBSEQUENT HOSPITAL CARE,LEVL II: ICD-10-PCS | Mod: GC,,, | Performed by: PSYCHIATRY & NEUROLOGY

## 2019-04-22 PROCEDURE — 99232 SBSQ HOSP IP/OBS MODERATE 35: CPT | Mod: GC,,, | Performed by: PSYCHIATRY & NEUROLOGY

## 2019-04-22 PROCEDURE — 90853 PR GROUP PSYCHOTHERAPY: ICD-10-PCS | Mod: ,,, | Performed by: PSYCHOLOGIST

## 2019-04-22 PROCEDURE — 90853 GROUP PSYCHOTHERAPY: CPT | Mod: ,,, | Performed by: PSYCHOLOGIST

## 2019-04-22 RX ADMIN — FOLIC ACID 1 MG: 1 TABLET ORAL at 08:04

## 2019-04-22 RX ADMIN — THERA TABS 1 TABLET: TAB at 08:04

## 2019-04-22 RX ADMIN — PALIPERIDONE 9 MG: 3 TABLET, EXTENDED RELEASE ORAL at 08:04

## 2019-04-22 RX ADMIN — FERROUS SULFATE TAB EC 325 MG (65 MG FE EQUIVALENT) 325 MG: 325 (65 FE) TABLET DELAYED RESPONSE at 08:04

## 2019-04-22 NOTE — PROGRESS NOTES
04/22/19 0900 04/22/19 1000 04/22/19 1100   [facility] Group Therapy   Group Name Community Reintegration Education Education   Specific Interventions Current Events Relapse Prevention Guided Imagery/Relaxation   Participation Level Appropriate;Minimal Minimal Minimal   Participation Quality Cooperative Requires Prompting Requires Prompting   Insight/Motivation Limited Limited Limited   Affect/Mood Display Blunted Blunted Blunted   Cognition Alert Alert;RIS Alert      04/22/19 1200   [facility] Group Therapy   Group Name Therapeutic Recreation   Specific Interventions Skilled Activity Crafts   Participation Level Minimal   Participation Quality Cooperative   Insight/Motivation Limited   Affect/Mood Display Appropriate;Blunted   Cognition Alert

## 2019-04-22 NOTE — PROGRESS NOTES
Group Psychotherapy (PhD/LCSW)    Site: [facility]    Clinical status of patient: Inpatient    Date: 4/22/2019    Group Focus: Life Skills    Length of service: 29085 - 35-40 minutes    Number of patients in attendance: 9    Referred by: Acute Psychiatry Unit Treatment Team    Target symptoms: Psychosis    Patient's response to treatment: Active Listening; Self-disclosure     Progress toward goals: Progressing slowly    Interval History:  Pt appeared alert and attentive in group. Pt responded briefly, appropriately when prompted in a group discussion of coping with distress and grief over a relationship break-up. .    Diagnosis:  Schizophrenia    Plan: Continue treatment on APU

## 2019-04-22 NOTE — PROGRESS NOTES
04/21/19 2000   [facility] Group Therapy   Group Name Community Reintegration   Specific Interventions Cognitive Stimulation Training   Participation Level Appropriate   Participation Quality Cooperative   Insight/Motivation Good;Improved   Affect/Mood Display Appropriate   Cognition Alert
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
## 2019-04-22 NOTE — PLAN OF CARE
Problem: Adult Behavioral Health Plan of Care  Goal: Plan of Care Review  Outcome: Ongoing (interventions implemented as appropriate)  Patient sitting up in day room. Patient alert and oriented. Patient isolative and guarded. Patient safety maintained. Will continue to monitor.

## 2019-04-22 NOTE — SUBJECTIVE & OBJECTIVE
"Interval History: see hospital course    Family History     Problem Relation (Age of Onset)    Breast cancer Maternal Aunt    Cancer Maternal Uncle        Tobacco Use    Smoking status: Never Smoker   Substance and Sexual Activity    Alcohol use: No    Drug use: Not on file    Sexual activity: Never     Birth control/protection: Surgical     Psychotherapeutics (From admission, onward)    Start     Stop Route Frequency Ordered    04/20/19 0900  paliperidone 24 hr tablet 9 mg      -- Oral Daily 04/19/19 0932    04/18/19 0421  OLANZapine tablet 10 mg  (Olanzapine)      -- Oral 3 times daily PRN 04/18/19 0421    04/18/19 0421  OLANZapine injection 10 mg  (Olanzapine)      -- IM 3 times daily PRN 04/18/19 0421           Review of Systems   Constitutional: Negative for fever.   HENT: Negative for congestion.      Objective:     Vital Signs (Most Recent):  Temp: 97.8 °F (36.6 °C) (04/22/19 0723)  Pulse: 79 (04/22/19 0723)  Resp: 18 (04/22/19 0723)  BP: (!) 109/58 (04/22/19 0723) Vital Signs (24h Range):  Temp:  [97.8 °F (36.6 °C)-98.1 °F (36.7 °C)] 97.8 °F (36.6 °C)  Pulse:  [79-83] 79  Resp:  [16-18] 18  BP: (109-110)/(56-58) 109/58     Height: 5' 6" (167.6 cm)  Weight: 63 kg (138 lb 14.2 oz)  Body mass index is 22.42 kg/m².    No intake or output data in the 24 hours ending 04/22/19 1042    Physical Exam        CONSTITUTIONAL  General Appearance: unremarkable, age appropriate     MUSCULOSKELETAL  Muscle Strength and Tone: WNL  Abnormal Involuntary Movements: none noted  Gait and Station: normal gait     PSYCHIATRIC   Appearance: unremarkable, age appropriate, wearing wig with patches of hair missing to resemble pattern baldness  Arousal: alert  Behavior/Cooperation: cooperative  Speech: normal volume, monotone  Language: English, fluid  Mood: euthymic  Affect: blunted  Thought Process: perseverative  Thought Content: denies SI/HI/AVH, +paranoia  Associations: no loose associations noted  Orientation: grossly " intact  Memory: Grossly intact  Fund of Knowledge: appropriate for education level  Attention Span/Concentration: Normal  Cognition: grossly intact  Insight: fair  Judgment: fair    Significant Labs:   Last 24 Hours:   Recent Lab Results     None          Significant Imaging: I have reviewed all pertinent imaging results/findings within the past 24 hours.

## 2019-04-22 NOTE — PLAN OF CARE
Problem: Adult Behavioral Health Plan of Care  Goal: Plan of Care Review  Outcome: Ongoing (interventions implemented as appropriate)  Observed awake and alert ambulating unit. Affect blunt, mood calm. Denied SI/HI. Thoughts are disorganized, and the pt observed RIS.. No agitation or hostile behavior noted, easily redirectable. Appeared asleep throughout the night as noted during frequent rounds. Free from falls/injury. Safety maintained. Continue to monitor

## 2019-04-23 PROCEDURE — 90853 PR GROUP PSYCHOTHERAPY: ICD-10-PCS | Mod: ,,, | Performed by: PSYCHOLOGIST

## 2019-04-23 PROCEDURE — 99232 SBSQ HOSP IP/OBS MODERATE 35: CPT | Mod: GC,,, | Performed by: PSYCHIATRY & NEUROLOGY

## 2019-04-23 PROCEDURE — 12400001 HC PSYCH SEMI-PRIVATE ROOM

## 2019-04-23 PROCEDURE — 90853 GROUP PSYCHOTHERAPY: CPT | Mod: ,,, | Performed by: PSYCHOLOGIST

## 2019-04-23 PROCEDURE — 99232 PR SUBSEQUENT HOSPITAL CARE,LEVL II: ICD-10-PCS | Mod: GC,,, | Performed by: PSYCHIATRY & NEUROLOGY

## 2019-04-23 PROCEDURE — 63600175 PHARM REV CODE 636 W HCPCS: Mod: JG | Performed by: STUDENT IN AN ORGANIZED HEALTH CARE EDUCATION/TRAINING PROGRAM

## 2019-04-23 PROCEDURE — 25000003 PHARM REV CODE 250: Performed by: STUDENT IN AN ORGANIZED HEALTH CARE EDUCATION/TRAINING PROGRAM

## 2019-04-23 RX ORDER — PALIPERIDONE 3 MG/1
6 TABLET, EXTENDED RELEASE ORAL DAILY
Status: DISCONTINUED | OUTPATIENT
Start: 2019-04-24 | End: 2019-04-30

## 2019-04-23 RX ADMIN — THERA TABS 1 TABLET: TAB at 11:04

## 2019-04-23 RX ADMIN — FERROUS SULFATE TAB EC 325 MG (65 MG FE EQUIVALENT) 325 MG: 325 (65 FE) TABLET DELAYED RESPONSE at 11:04

## 2019-04-23 RX ADMIN — PALIPERIDONE PALMITATE 234 MG: 234 INJECTION INTRAMUSCULAR at 11:04

## 2019-04-23 RX ADMIN — FOLIC ACID 1 MG: 1 TABLET ORAL at 11:04

## 2019-04-23 NOTE — PLAN OF CARE
04/23/19 1500   [facility] Group Therapy   Group Name Medication   Participation Level Appropriate;Sharing   Participation Quality Cooperative   Insight/Motivation Improved   Affect/Mood Display Appropriate
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
## 2019-04-23 NOTE — PROGRESS NOTES
04/22/19 2000   [facility] Group Therapy   Group Name Community Reintegration   Specific Interventions Current Events   Participation Level Minimal   Participation Quality Cooperative   Insight/Motivation Limited   Affect/Mood Display Flat   Cognition Confused

## 2019-04-23 NOTE — PROGRESS NOTES
04/23/19 0900 04/23/19 1000 04/23/19 1100   [facility] Group Therapy   Group Name Community Reintegration Mental Awareness Education   Specific Interventions Current Events Cognitive Stimulation Training Guided Imagery/Relaxation   Participation Level Minimal Minimal Minimal   Participation Quality Cooperative Cooperative Cooperative   Insight/Motivation Limited Limited Limited   Affect/Mood Display Blunted Blunted Blunted   Cognition Alert Alert;RIS Alert      04/23/19 1400   [facility] Group Therapy   Group Name Therapeutic Recreation   Specific Interventions Skilled Activity Mild Exercises   Participation Level Minimal   Participation Quality Cooperative   Insight/Motivation Limited   Affect/Mood Display Appropriate   Cognition RIS

## 2019-04-23 NOTE — PLAN OF CARE
Problem: Adult Behavioral Health Plan of Care  Goal: Plan of Care Review  Outcome: Ongoing (interventions implemented as appropriate)  Compliant with scheduled evening activities.  Attended evening group.   Speech rapid @ times; prn olanzapine offered; patient refused.  No physical complaints.  Retired to bed at appropriate time.  Safety maintained.

## 2019-04-23 NOTE — PLAN OF CARE
04/22/19 1600   [facility] Group Therapy   Group Name Medication   Participation Level Appropriate   Participation Quality Cooperative   Insight/Motivation Limited

## 2019-04-23 NOTE — NURSING
Spoke with [facility]. They offer counseling in the home, but do not have a nurse or MD on staff to give patient monthly injection.  Attempted to call pt's , Nancy Hart at [phone] to set up meeting and to get more information on services.    Will also call [facility] to see if they can assist with resources in the community.

## 2019-04-23 NOTE — NURSING
Appears to have slept approximately 7-8 hours thus far this shift.  Safety maintained throughout shift.  See Observation Checklist.  Will continue to monitor.

## 2019-04-23 NOTE — SUBJECTIVE & OBJECTIVE
"Interval History: see hospital course    Family History     Problem Relation (Age of Onset)    Breast cancer Maternal Aunt    Cancer Maternal Uncle        Tobacco Use    Smoking status: Never Smoker   Substance and Sexual Activity    Alcohol use: No    Drug use: Not on file    Sexual activity: Never     Birth control/protection: Surgical     Psychotherapeutics (From admission, onward)    Start     Stop Route Frequency Ordered    04/24/19 0900  paliperidone 24 hr tablet 6 mg      -- Oral Daily 04/23/19 0955    04/23/19 1100  paliperidone palmitate injection 234 mg      -- IM Once 04/23/19 0955    04/18/19 0421  OLANZapine tablet 10 mg  (Olanzapine)      -- Oral 3 times daily PRN 04/18/19 0421    04/18/19 0421  OLANZapine injection 10 mg  (Olanzapine)      -- IM 3 times daily PRN 04/18/19 0421           Review of Systems   Constitutional: Negative for fever.   HENT: Negative for congestion.      Objective:     Vital Signs (Most Recent):  Temp: 98.1 °F (36.7 °C) (04/23/19 0741)  Pulse: 92 (04/23/19 0741)  Resp: 18 (04/23/19 0741)  BP: (!) 121/59 (04/23/19 0741) Vital Signs (24h Range):  Temp:  [98 °F (36.7 °C)-98.1 °F (36.7 °C)] 98.1 °F (36.7 °C)  Pulse:  [92-97] 92  Resp:  [17-18] 18  BP: (111-121)/(59-60) 121/59     Height: 5' 6" (167.6 cm)  Weight: 63 kg (138 lb 14.2 oz)  Body mass index is 22.42 kg/m².    No intake or output data in the 24 hours ending 04/23/19 0956    Physical Exam        CONSTITUTIONAL  General Appearance: unremarkable, age appropriate     MUSCULOSKELETAL  Muscle Strength and Tone: WNL  Abnormal Involuntary Movements: none noted  Gait and Station: normal gait     PSYCHIATRIC   Appearance: unremarkable, age appropriate, wearing wig with patches of hair missing to resemble pattern baldness  Arousal: alert  Behavior/Cooperation: cooperative  Speech: normal volume, monotone  Language: English, fluid  Mood: "pretty good, better"  Affect: blunted  Thought Process: perseverative  Thought Content: " denies SI/HI/AVH, +paranoia  Associations: no loose associations noted  Orientation: grossly intact  Memory: Grossly intact  Fund of Knowledge: appropriate for education level  Attention Span/Concentration: Normal  Cognition: grossly intact  Insight: fair  Judgment: fair    Significant Labs:   Last 24 Hours:   Recent Lab Results     None        Significant Imaging: I have reviewed all pertinent imaging results/findings within the past 24 hours.

## 2019-04-23 NOTE — PLAN OF CARE
"Pt displayed a blunted affect. Calm and cooperative. Stated that's he is feeling"much better". Pt agreed to taking Invega IM this shift, which was administered by this RN; tolerated well.  Compliant with a ll other medications. Attended unit activities. Denies SI/HI. Denies AH/VH. No physical complaints voiced. NAD observed. Will cont to monitor.  "

## 2019-04-23 NOTE — PROGRESS NOTES
Group Psychotherapy (PhD/LCSW)    Site: [facility]    Clinical status of patient: Inpatient    Date: 4/23/2019    Group Focus: Life Skills    Length of service: 00537 - 35-40 minutes    Number of patients in attendance: 8    Referred by: Acute Psychiatry Unit Treatment Team    Target symptoms: Psychosis    Patient's response to treatment: Active Listening; Self-disclosure     Progress toward goals: Progressing slowly    Interval History: Pt appeared alert and attentive in group. Pt responded appropriately when prompted in a group discussion of strategies for effective anger management. She said that when she gets angry it helps her to take calming breaths and to take a walk.     Diagnosis:  Paranoid Schizophrenia    Plan: Continue treatment on APU

## 2019-04-23 NOTE — NURSING
Called [facility]. Staff who worked with patient, Francisco, is currently in the field. Left me call back information and he will call back once he is back in the office.
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
## 2019-04-24 PROCEDURE — 90853 GROUP PSYCHOTHERAPY: CPT | Mod: ,,, | Performed by: PSYCHOLOGIST

## 2019-04-24 PROCEDURE — 99232 PR SUBSEQUENT HOSPITAL CARE,LEVL II: ICD-10-PCS | Mod: GC,,, | Performed by: PSYCHIATRY & NEUROLOGY

## 2019-04-24 PROCEDURE — 90853 PR GROUP PSYCHOTHERAPY: ICD-10-PCS | Mod: ,,, | Performed by: PSYCHOLOGIST

## 2019-04-24 PROCEDURE — 12400001 HC PSYCH SEMI-PRIVATE ROOM

## 2019-04-24 PROCEDURE — 25000003 PHARM REV CODE 250: Performed by: STUDENT IN AN ORGANIZED HEALTH CARE EDUCATION/TRAINING PROGRAM

## 2019-04-24 PROCEDURE — 97150 GROUP THERAPEUTIC PROCEDURES: CPT

## 2019-04-24 PROCEDURE — 97165 OT EVAL LOW COMPLEX 30 MIN: CPT

## 2019-04-24 PROCEDURE — 99232 SBSQ HOSP IP/OBS MODERATE 35: CPT | Mod: GC,,, | Performed by: PSYCHIATRY & NEUROLOGY

## 2019-04-24 RX ADMIN — THERA TABS 1 TABLET: TAB at 08:04

## 2019-04-24 RX ADMIN — FERROUS SULFATE TAB EC 325 MG (65 MG FE EQUIVALENT) 325 MG: 325 (65 FE) TABLET DELAYED RESPONSE at 08:04

## 2019-04-24 RX ADMIN — PALIPERIDONE 6 MG: 3 TABLET, EXTENDED RELEASE ORAL at 08:04

## 2019-04-24 RX ADMIN — FOLIC ACID 1 MG: 1 TABLET ORAL at 08:04

## 2019-04-24 NOTE — PLAN OF CARE
04/24/19 1500   [facility] Group Therapy   Group Name Medication   Participation Level Appropriate   Participation Quality Cooperative   Insight/Motivation Limited   Affect/Mood Display Appropriate   Cognition Alert

## 2019-04-24 NOTE — NURSING
Called [facility] [phone] to obtain correct number for pt's CM. The number they provided is the number I called. Let them know that this was wrong number. She took my callback number and will have CM call me.

## 2019-04-24 NOTE — SUBJECTIVE & OBJECTIVE
"Interval History: see hospital course    Family History     Problem Relation (Age of Onset)    Breast cancer Maternal Aunt    Cancer Maternal Uncle        Tobacco Use    Smoking status: Never Smoker   Substance and Sexual Activity    Alcohol use: No    Drug use: Not on file    Sexual activity: Never     Birth control/protection: Surgical     Psychotherapeutics (From admission, onward)    Start     Stop Route Frequency Ordered    04/24/19 0900  paliperidone 24 hr tablet 6 mg      -- Oral Daily 04/23/19 0955    04/18/19 0421  OLANZapine tablet 10 mg  (Olanzapine)      -- Oral 3 times daily PRN 04/18/19 0421    04/18/19 0421  OLANZapine injection 10 mg  (Olanzapine)      -- IM 3 times daily PRN 04/18/19 0421           Review of Systems   Constitutional: Negative for fever.   HENT: Negative for congestion.      Objective:     Vital Signs (Most Recent):  Temp: 98.6 °F (37 °C) (04/24/19 0800)  Pulse: 100 (04/24/19 0800)  Resp: 16 (04/24/19 0800)  BP: 114/68 (04/24/19 0800) Vital Signs (24h Range):  Temp:  [98.2 °F (36.8 °C)-98.6 °F (37 °C)] 98.6 °F (37 °C)  Pulse:  [] 100  Resp:  [16] 16  BP: (110-114)/(60-68) 114/68     Height: 5' 6" (167.6 cm)  Weight: 63 kg (138 lb 14.2 oz)  Body mass index is 22.42 kg/m².    No intake or output data in the 24 hours ending 04/24/19 1030    Physical Exam        CONSTITUTIONAL  General Appearance: unremarkable, age appropriate     MUSCULOSKELETAL  Muscle Strength and Tone: WNL  Abnormal Involuntary Movements: none noted  Gait and Station: normal gait     PSYCHIATRIC   Appearance: unremarkable, age appropriate, wearing wig with patches of hair missing to resemble pattern baldness  Arousal: alert  Behavior/Cooperation: cooperative  Speech: normal volume, monotone  Language: English, fluid  Mood: "much better"  Affect: blunted  Thought Process: perseverative  Thought Content: denies SI/HI/AVH, +paranoia  Associations: no loose associations noted  Orientation: grossly " intact  Memory: Grossly intact  Fund of Knowledge: appropriate for education level  Attention Span/Concentration: Normal  Cognition: grossly intact  Insight: fair  Judgment: fair    Significant Labs:   Last 24 Hours:   Recent Lab Results     None        Significant Imaging: I have reviewed all pertinent imaging results/findings within the past 24 hours.

## 2019-04-24 NOTE — PLAN OF CARE
Problem: Adult Behavioral Health Plan of Care  Goal: Plan of Care Review  Outcome: Ongoing (interventions implemented as appropriate)  Observed awake and alert. Affect blunt mood less anxious than noted in previous days. Pt's still disorganized and RIS. Pt observed in her room talking to herself and at times overheard cursing. Pt's easily redirectable. Appeared asleep throughout the night as noted during frequent rounds. Free from falls/injury. Safety maintained. Encourage to seek staff assistance with needs. Continue to monitor.

## 2019-04-24 NOTE — NURSING
"Pt is not eligible for ACT services because she has Medicare A+B.    [facility] hospital transition team will come talk to patient Friday about resources in the community that can help her access mental health care. At this time, pt will likely be followed by [facility].    I spoke with patient and she is on wait list for permanent supportive housing. She is worried that they will call her while she is here and she will miss the call. With her permission, I called [place] supportive housing and let them know she was inpatient and did not have access to her cell phone and explained she was worried about missing the call.    She did give me permission to speak with her brother ONLY to find out if she could return to his home. She did not want me to give him any information about her care. Pt is still paranoid and suspicious and feels like people "lied on her."   "

## 2019-04-24 NOTE — ASSESSMENT & PLAN NOTE
- will continue supplemental iron  -Ferritin, TIBC, and serum iron; added on to previous labs from 4/18 follow up

## 2019-04-24 NOTE — PLAN OF CARE
Pt displayed a blunted affect. Calm and cooperative. Continues to perseverate on asking staff about housing; pt was instructed to discuss this with SW. Medication compliant. Attended unit activities. Denies SI/HI. Denies AH/VH. No physical complaints voiced. NAD observed. Will cont to monitor.
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
## 2019-04-24 NOTE — NURSING
Attempted to call patient's , Melinda MCDUFFIE  Left  and asked her to call me. Did not leave any identifying information. The person who returned my call is not  and this is the wrong number.     Spoke with Francisco from [employer]. He gave me an updated number for patient's brother, Dylon ([phone]).    Stated that her current psychiatry care/med management was through [facility]. He will call me if he can get name of specific provider. [facility] is making a referral for the hospital transition team and they will see patient before d/c. They are able to connect patient with services in the community , such as transportation.    Francisco thinks that patient may currently be a ACT Team client with [facility]. He will find out and call me back. He is unable to find out if patient is followed by [facility] team because he doesn't have a release and [place]. If patient does not have ACT, I will try to call [facility] later today and see if any ACT team Medicare slots are open. Pt would have to agree to ACT team services and any community based services.

## 2019-04-24 NOTE — PROGRESS NOTES
Group Psychotherapy (PhD/LCSW)    Site: [facility]    Clinical status of patient: Inpatient    Date: 4/24/2019    Group Focus: Life Skills    Length of service: 31701 - 35-40 minutes    Number of patients in attendance: 8    Referred by: Acute Psychiatry Unit Treatment Team    Target symptoms: Psychosis    Patient's response to treatment: Active Listening; Self-disclosure     Progress toward goals: Progressing slowly    Interval History: Pt appeared alert and attentive in group. Pt responded appropriately when prompted in a group discussion of strategies for coping with anxiety. Pt said that when she is anxious she needs to withdraw and be by herself in her room to calm down.     Diagnosis:  Paranoid Schizophrenia    Plan: Continue treatment on [facility]
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
## 2019-04-24 NOTE — PROGRESS NOTES
04/24/19 0900 04/24/19 1000 04/24/19 1100   [facility] Group Therapy   Group Name Community Reintegration Education   (pet therapy)   Specific Interventions Current Events Guided Imagery/Relaxation Sensory Stimulation   Participation Level Active;Appropriate Active;Appropriate Active;Appropriate   Participation Quality Cooperative Cooperative Cooperative   Insight/Motivation Good Limited;Improved Limited   Affect/Mood Display Appropriate Bizarre;Appropriate Bizarre;Appropriate   Cognition Alert Alert Alert      04/24/19 1300   [facility] Group Therapy   Group Name Therapeutic Recreation   Specific Interventions Skilled Activity Crafts   Participation Level Active;Appropriate   Participation Quality Cooperative   Insight/Motivation Limited   Affect/Mood Display Bizarre;Appropriate   Cognition Alert
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
## 2019-04-25 PROCEDURE — 99232 PR SUBSEQUENT HOSPITAL CARE,LEVL II: ICD-10-PCS | Mod: ,,, | Performed by: PSYCHIATRY & NEUROLOGY

## 2019-04-25 PROCEDURE — 90853 GROUP PSYCHOTHERAPY: CPT | Mod: ,,, | Performed by: PSYCHOLOGIST

## 2019-04-25 PROCEDURE — 12400001 HC PSYCH SEMI-PRIVATE ROOM

## 2019-04-25 PROCEDURE — 99232 SBSQ HOSP IP/OBS MODERATE 35: CPT | Mod: ,,, | Performed by: PSYCHIATRY & NEUROLOGY

## 2019-04-25 PROCEDURE — 25000003 PHARM REV CODE 250: Performed by: STUDENT IN AN ORGANIZED HEALTH CARE EDUCATION/TRAINING PROGRAM

## 2019-04-25 PROCEDURE — 90853 PR GROUP PSYCHOTHERAPY: ICD-10-PCS | Mod: ,,, | Performed by: PSYCHOLOGIST

## 2019-04-25 RX ADMIN — PALIPERIDONE 6 MG: 3 TABLET, EXTENDED RELEASE ORAL at 09:04

## 2019-04-25 RX ADMIN — FERROUS SULFATE TAB EC 325 MG (65 MG FE EQUIVALENT) 325 MG: 325 (65 FE) TABLET DELAYED RESPONSE at 09:04

## 2019-04-25 RX ADMIN — FOLIC ACID 1 MG: 1 TABLET ORAL at 09:04

## 2019-04-25 RX ADMIN — THERA TABS 1 TABLET: TAB at 09:04
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
## 2019-04-25 NOTE — NURSING
With patient's permission, called pt's brother Dylon ([phone]). Left voice message with my contact information and asked that he call me back. Also left Cruz's info in case he got message after business hours.

## 2019-04-25 NOTE — PLAN OF CARE
04/25/19 1600   [facility] Group Therapy   Group Name Medication   Participation Level Appropriate;Attentive;Sharing   Participation Quality Cooperative   Insight/Motivation Limited   Affect/Mood Display Anxious;Appropriate   Cognition Alert
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
## 2019-04-25 NOTE — PLAN OF CARE
Problem: Adult Behavioral Health Plan of Care  Goal: Plan of Care Review  Outcome: Ongoing (interventions implemented as appropriate)  Patient sitting up in bed in room. Patient isolative and guarded. Patient safety maintained. Will continue to monitor.

## 2019-04-25 NOTE — SUBJECTIVE & OBJECTIVE
"Interval History: see hospital course    Family History     Problem Relation (Age of Onset)    Breast cancer Maternal Aunt    Cancer Maternal Uncle        Tobacco Use    Smoking status: Never Smoker   Substance and Sexual Activity    Alcohol use: No    Drug use: Not on file    Sexual activity: Never     Birth control/protection: Surgical     Psychotherapeutics (From admission, onward)    Start     Stop Route Frequency Ordered    04/24/19 0900  paliperidone 24 hr tablet 6 mg      -- Oral Daily 04/23/19 0955    04/18/19 0421  OLANZapine tablet 10 mg  (Olanzapine)      -- Oral 3 times daily PRN 04/18/19 0421    04/18/19 0421  OLANZapine injection 10 mg  (Olanzapine)      -- IM 3 times daily PRN 04/18/19 0421           Review of Systems   Constitutional: Negative for fever.   HENT: Negative for congestion.      Objective:     Vital Signs (Most Recent):  Temp: 98.1 °F (36.7 °C) (04/24/19 2007)  Pulse: 93 (04/24/19 2007)  Resp: 18 (04/24/19 2007)  BP: 120/60 (04/24/19 2007)  SpO2: 100 % (04/24/19 2007) Vital Signs (24h Range):  Temp:  [98.1 °F (36.7 °C)] 98.1 °F (36.7 °C)  Pulse:  [93] 93  Resp:  [18] 18  SpO2:  [100 %] 100 %  BP: (120)/(60) 120/60     Height: 5' 6" (167.6 cm)  Weight: 63 kg (138 lb 14.2 oz)  Body mass index is 22.42 kg/m².    No intake or output data in the 24 hours ending 04/25/19 0948    Physical Exam        CONSTITUTIONAL  General Appearance: unremarkable, age appropriate     MUSCULOSKELETAL  Muscle Strength and Tone: WNL  Abnormal Involuntary Movements: none noted  Gait and Station: normal gait     PSYCHIATRIC   Appearance: unremarkable, age appropriate, wearing wig with patches of hair missing to resemble pattern baldness  Arousal: alert  Behavior/Cooperation: cooperative  Speech: normal volume, monotone  Language: English, fluid  Mood: "much better"  Affect: blunted  Thought Process: perseverative   Thought Content: denies SI/HI/AVH, +paranoia, perseverates on wanting to be "left alone at " "home"  Associations: no loose associations noted  Orientation: grossly intact  Memory: Grossly intact  Fund of Knowledge: appropriate for education level  Attention Span/Concentration: Normal  Cognition: grossly intact  Insight: fair  Judgment: fair    Significant Labs:   Last 24 Hours:   Recent Lab Results     None        Significant Imaging: I have reviewed all pertinent imaging results/findings within the past 24 hours.  "

## 2019-04-25 NOTE — PROGRESS NOTES
Group Psychotherapy (PhD/LCSW)    Site: [facility]    Clinical status of patient: Inpatient    Date: 4/25/2019    Group Focus: Life Skills    Length of service: 07398 - 35-40 minutes    Number of patients in attendance: 8    Referred by: Acute Psychiatry Unit Treatment Team    Target symptoms: Psychosis    Patient's response to treatment: Active Listening; Self-disclosure     Progress toward goals: Progressing slowly    Interval History:  Pt appeared alert and attentive in group. Pt participated when prompted in a group discussion of how to cope with unfair labeling or criticism from others. Pt tended to focus on the unfair tx by others without looking at her own behavior.     Diagnosis:  Paranoid Schizophrenia    Plan: Continue treatment on APU

## 2019-04-25 NOTE — PROGRESS NOTES
04/25/19 0900 04/25/19 1000 04/25/19 1100   [facility] Group Therapy   Group Name Community Reintegration Education Education   Specific Interventions Current Events Relapse Prevention Guided Imagery/Relaxation   Participation Level Minimal Minimal Minimal   Participation Quality Cooperative Cooperative Cooperative   Insight/Motivation Limited Limited Limited   Affect/Mood Display Appropriate Appropriate Appropriate   Cognition Alert Alert Alert      04/25/19 1400   [facility] Group Therapy   Group Name Therapeutic Recreation   Specific Interventions Skilled Activity Crafts   Participation Level Minimal   Participation Quality Cooperative   Insight/Motivation Limited   Affect/Mood Display Appropriate   Cognition Alert

## 2019-04-26 PROCEDURE — 90853 PR GROUP PSYCHOTHERAPY: ICD-10-PCS | Mod: ,,, | Performed by: PSYCHOLOGIST

## 2019-04-26 PROCEDURE — 12400001 HC PSYCH SEMI-PRIVATE ROOM

## 2019-04-26 PROCEDURE — 99232 SBSQ HOSP IP/OBS MODERATE 35: CPT | Mod: ,,, | Performed by: PSYCHIATRY & NEUROLOGY

## 2019-04-26 PROCEDURE — 25000003 PHARM REV CODE 250: Performed by: STUDENT IN AN ORGANIZED HEALTH CARE EDUCATION/TRAINING PROGRAM

## 2019-04-26 PROCEDURE — 90853 GROUP PSYCHOTHERAPY: CPT | Mod: ,,, | Performed by: PSYCHOLOGIST

## 2019-04-26 PROCEDURE — 99232 PR SUBSEQUENT HOSPITAL CARE,LEVL II: ICD-10-PCS | Mod: ,,, | Performed by: PSYCHIATRY & NEUROLOGY

## 2019-04-26 RX ADMIN — PALIPERIDONE 6 MG: 3 TABLET, EXTENDED RELEASE ORAL at 08:04

## 2019-04-26 RX ADMIN — FERROUS SULFATE TAB EC 325 MG (65 MG FE EQUIVALENT) 325 MG: 325 (65 FE) TABLET DELAYED RESPONSE at 08:04

## 2019-04-26 RX ADMIN — FOLIC ACID 1 MG: 1 TABLET ORAL at 08:04

## 2019-04-26 RX ADMIN — THERA TABS 1 TABLET: TAB at 08:04
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
## 2019-04-26 NOTE — PROGRESS NOTES
Group Psychotherapy (PhD/LCSW)    Site: [facility]    Clinical status of patient: Inpatient    Date: 4/26/2019    Group Focus: Life Skills    Length of service: 60152 - 35-40 minutes    Number of patients in attendance: 8    Referred by: Acute Psychiatry Unit Treatment Team    Target symptoms: Psychosis    Patient's response to treatment: Active Listening; Self-disclosure     Progress toward goals: Progressing slowly    Interval History:  Pt appeared alert and attentive in group. Pt responded actively when prompted in a group discussion of how to recover from the loss of a relationship with a significant other. Pt's comments were related to the subject but tended to be disorganized.     Diagnosis:  Paranoid Schizophrenia    Plan: Continue treatment on APU
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
## 2019-04-26 NOTE — PROGRESS NOTES
04/26/19 0900 04/26/19 1000 04/26/19 1100   [facility] Group Therapy   Group Name Community Reintegration Mental Awareness   (pet therapy)   Specific Interventions Current Events Cognitive Stimulation Training Sensory Stimulation   Participation Level Active;Appropriate Minimal;Active Active;Appropriate   Participation Quality Cooperative Cooperative Cooperative   Insight/Motivation Limited Limited Limited   Affect/Mood Display Appropriate Appropriate Appropriate   Cognition Alert Alert Alert      04/26/19 1300   [facility] Group Therapy   Group Name Therapeutic Recreation   Specific Interventions   (movie social)   Participation Level Minimal   Participation Quality Cooperative   Insight/Motivation Limited   Affect/Mood Display Appropriate   Cognition Alert

## 2019-04-26 NOTE — SUBJECTIVE & OBJECTIVE
"Interval History: see hospital course    Family History     Problem Relation (Age of Onset)    Breast cancer Maternal Aunt    Cancer Maternal Uncle        Tobacco Use    Smoking status: Never Smoker   Substance and Sexual Activity    Alcohol use: No    Drug use: Not on file    Sexual activity: Never     Birth control/protection: Surgical     Psychotherapeutics (From admission, onward)    Start     Stop Route Frequency Ordered    04/28/19 1200  paliperidone palmitate injection 156 mg      -- IM Once 04/25/19 1143    04/24/19 0900  paliperidone 24 hr tablet 6 mg      -- Oral Daily 04/23/19 0955    04/18/19 0421  OLANZapine tablet 10 mg  (Olanzapine)      -- Oral 3 times daily PRN 04/18/19 0421 04/18/19 0421  OLANZapine injection 10 mg  (Olanzapine)      -- IM 3 times daily PRN 04/18/19 0421           Review of Systems   Constitutional: Negative for fever.   HENT: Negative for congestion.      Objective:     Vital Signs (Most Recent):  Temp: 98.1 °F (36.7 °C) (04/26/19 0719)  Pulse: 92 (04/26/19 0719)  Resp: 16 (04/26/19 0719)  BP: (!) 120/57 (04/26/19 0719)  SpO2: 100 % (04/24/19 2007) Vital Signs (24h Range):  Temp:  [98.1 °F (36.7 °C)-98.8 °F (37.1 °C)] 98.1 °F (36.7 °C)  Pulse:  [] 92  Resp:  [16-18] 16  BP: (109-120)/(55-58) 120/57     Height: 5' 6" (167.6 cm)  Weight: 63 kg (138 lb 14.2 oz)  Body mass index is 22.42 kg/m².    No intake or output data in the 24 hours ending 04/26/19 1104    Physical Exam        CONSTITUTIONAL  General Appearance: unremarkable, age appropriate     MUSCULOSKELETAL  Muscle Strength and Tone: WNL  Abnormal Involuntary Movements: none noted  Gait and Station: normal gait     PSYCHIATRIC   Appearance: unremarkable, age appropriate, wearing wig with patches of hair missing to resemble pattern baldness  Arousal: alert  Behavior/Cooperation: cooperative  Speech: normal volume, monotone  Language: English, fluid  Mood: "much better"  Affect: blunted  Thought Process: " perseverative, guarded  Thought Content: denies SI/HI/AVH, +paranoia, perseverates on concerns about her family members  Associations: no loose associations noted  Orientation: grossly intact  Memory: Grossly intact  Fund of Knowledge: appropriate for education level  Attention Span/Concentration: Normal  Cognition: grossly intact  Insight: fair  Judgment: fair    Significant Labs:   Last 24 Hours:   Recent Lab Results     None        Significant Imaging: I have reviewed all pertinent imaging results/findings within the past 24 hours.
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
## 2019-04-26 NOTE — NURSING
The patient appears to have slept 8.0 hours. She has just returned to her room from sitting in the dayroom for a brief time. She was noted RIS while in the dayroom.

## 2019-04-26 NOTE — PLAN OF CARE
Problem: Adult Behavioral Health Plan of Care  Goal: Plan of Care Review  Outcome: Ongoing (interventions implemented as appropriate)  Patient alert and oriented. Patient calm and cooperative and denies any discomfort or distress. Safety maintained. Will continue to monitor.

## 2019-04-27 PROCEDURE — 25000003 PHARM REV CODE 250: Performed by: STUDENT IN AN ORGANIZED HEALTH CARE EDUCATION/TRAINING PROGRAM

## 2019-04-27 PROCEDURE — 12400001 HC PSYCH SEMI-PRIVATE ROOM

## 2019-04-27 PROCEDURE — 99232 SBSQ HOSP IP/OBS MODERATE 35: CPT | Mod: ,,, | Performed by: PSYCHIATRY & NEUROLOGY

## 2019-04-27 PROCEDURE — 99232 PR SUBSEQUENT HOSPITAL CARE,LEVL II: ICD-10-PCS | Mod: ,,, | Performed by: PSYCHIATRY & NEUROLOGY

## 2019-04-27 RX ADMIN — FOLIC ACID 1 MG: 1 TABLET ORAL at 08:04

## 2019-04-27 RX ADMIN — THERA TABS 1 TABLET: TAB at 08:04

## 2019-04-27 RX ADMIN — PALIPERIDONE 6 MG: 3 TABLET, EXTENDED RELEASE ORAL at 08:04

## 2019-04-27 RX ADMIN — FERROUS SULFATE TAB EC 325 MG (65 MG FE EQUIVALENT) 325 MG: 325 (65 FE) TABLET DELAYED RESPONSE at 08:04

## 2019-04-27 NOTE — PLAN OF CARE
Problem: Adult Behavioral Health Plan of Care  Goal: Plan of Care Review  Outcome: Ongoing (interventions implemented as appropriate)  Patient sitting up in day room. Patient calm and cooperative and denies any a/v hallucinations. Safety maintained. Will continue to monitor.

## 2019-04-27 NOTE — PLAN OF CARE
Problem: Adult Behavioral Health Plan of Care  Goal: Plan of Care Review  Outcome: Ongoing (interventions implemented as appropriate)  Compliant with scheduled evening activities.  No SI/HI/AVH, or acting out behaviors.  No physical complaints.  Retired to bed at appropriate time.  Safety maintained.
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
## 2019-04-27 NOTE — SUBJECTIVE & OBJECTIVE
"Interval History: see hospital course    Family History     Problem Relation (Age of Onset)    Breast cancer Maternal Aunt    Cancer Maternal Uncle        Tobacco Use    Smoking status: Never Smoker   Substance and Sexual Activity    Alcohol use: No    Drug use: Not on file    Sexual activity: Never     Birth control/protection: Surgical     Psychotherapeutics (From admission, onward)    Start     Stop Route Frequency Ordered    04/28/19 1200  paliperidone palmitate injection 156 mg      -- IM Once 04/25/19 1143    04/24/19 0900  paliperidone 24 hr tablet 6 mg      -- Oral Daily 04/23/19 0955    04/18/19 0421  OLANZapine tablet 10 mg  (Olanzapine)      -- Oral 3 times daily PRN 04/18/19 0421    04/18/19 0421  OLANZapine injection 10 mg  (Olanzapine)      -- IM 3 times daily PRN 04/18/19 0421           Review of Systems   Constitutional: Negative for fever.   HENT: Negative for congestion.      Objective:     Vital Signs (Most Recent):  Temp: 97.8 °F (36.6 °C) (04/27/19 0744)  Pulse: 103 (04/27/19 0744)  Resp: 17 (04/27/19 0744)  BP: 110/63 (04/27/19 0744)  SpO2: 100 % (04/24/19 2007) Vital Signs (24h Range):  Temp:  [97.8 °F (36.6 °C)-98.2 °F (36.8 °C)] 97.8 °F (36.6 °C)  Pulse:  [] 103  Resp:  [16-17] 17  BP: (110-112)/(51-63) 110/63     Height: 5' 6" (167.6 cm)  Weight: 63 kg (138 lb 14.2 oz)  Body mass index is 22.42 kg/m².    No intake or output data in the 24 hours ending 04/27/19 1006    Physical Exam        CONSTITUTIONAL  General Appearance: unremarkable, age appropriate     MUSCULOSKELETAL  Muscle Strength and Tone: WNL  Abnormal Involuntary Movements: none noted  Gait and Station: normal gait     PSYCHIATRIC   Appearance: unremarkable, age appropriate, wearing wig with patches of hair missing to resemble pattern baldness  Arousal: alert  Behavior/Cooperation: cooperative  Speech: normal volume, monotone, rapid  Language: English, fluid  Mood: "much better"  Affect: blunted  Thought Process: " perseverative, guarded  Thought Content: denies SI/HI/AVH, +paranoia regarding her family members  Associations: no loose associations noted  Orientation: grossly intact  Memory: Grossly intact  Fund of Knowledge: appropriate for education level  Attention Span/Concentration: Normal  Cognition: grossly intact  Insight: fair  Judgment: fair    Significant Labs:   Last 24 Hours:   Recent Lab Results     None        Significant Imaging: I have reviewed all pertinent imaging results/findings within the past 24 hours.

## 2019-04-27 NOTE — PROGRESS NOTES
04/27/19 0900 04/27/19 1000 04/27/19 1100   [facility] Group Therapy   Group Name Community Reintegration Process Process   Specific Interventions Current Events Other (see comments) Other (see comments)   Participation Level Appropriate Appropriate;Sharing Appropriate;Supportive   Participation Quality Cooperative Cooperative Cooperative   Insight/Motivation Improved Improved Improved   Affect/Mood Display Appropriate Appropriate Appropriate   Cognition Alert Alert Alert      04/27/19 1300   [facility] Therapy   Group Name Education   Specific Interventions Skilled Activity Leisure Education and Awareness   Participation Level Appropriate   Participation Quality Cooperative   Insight/Motivation Improved   Affect/Mood Display Appropriate   Cognition Alert

## 2019-04-28 PROCEDURE — 12400001 HC PSYCH SEMI-PRIVATE ROOM

## 2019-04-28 PROCEDURE — 99232 PR SUBSEQUENT HOSPITAL CARE,LEVL II: ICD-10-PCS | Mod: ,,, | Performed by: PSYCHIATRY & NEUROLOGY

## 2019-04-28 PROCEDURE — 25000003 PHARM REV CODE 250: Performed by: STUDENT IN AN ORGANIZED HEALTH CARE EDUCATION/TRAINING PROGRAM

## 2019-04-28 PROCEDURE — 63600175 PHARM REV CODE 636 W HCPCS: Mod: JG | Performed by: STUDENT IN AN ORGANIZED HEALTH CARE EDUCATION/TRAINING PROGRAM

## 2019-04-28 PROCEDURE — 99232 SBSQ HOSP IP/OBS MODERATE 35: CPT | Mod: ,,, | Performed by: PSYCHIATRY & NEUROLOGY

## 2019-04-28 RX ADMIN — FERROUS SULFATE TAB EC 325 MG (65 MG FE EQUIVALENT) 325 MG: 325 (65 FE) TABLET DELAYED RESPONSE at 08:04

## 2019-04-28 RX ADMIN — PALIPERIDONE 6 MG: 3 TABLET, EXTENDED RELEASE ORAL at 08:04

## 2019-04-28 RX ADMIN — FOLIC ACID 1 MG: 1 TABLET ORAL at 08:04

## 2019-04-28 RX ADMIN — THERA TABS 1 TABLET: TAB at 09:04

## 2019-04-28 RX ADMIN — PALIPERIDONE PALMITATE 156 MG: 156 INJECTION INTRAMUSCULAR at 12:04

## 2019-04-28 NOTE — PROGRESS NOTES
04/28/19 0900 04/28/19 1030 04/28/19 1100   [facility] Group Therapy   Group Name Community Reintegration Exercise Stress Management   Specific Interventions Current Events Skilled Activity Mild Exercises Guided Imagery/Relaxation   Participation Level Active Active Appropriate;Attentive   Participation Quality Cooperative Cooperative Cooperative   Insight/Motivation Limited Improved Improved   Affect/Mood Display Bizarre Appropriate Appropriate   Cognition Alert  --  Alert      04/28/19 1300   [facility] Group Therapy   Group Name Therapeutic Recreation   Specific Interventions   (board games)   Participation Level Appropriate   Participation Quality Cooperative   Insight/Motivation Improved   Affect/Mood Display Bizarre   Cognition Alert

## 2019-04-28 NOTE — SUBJECTIVE & OBJECTIVE
"Interval History: see hospital course    Family History     Problem Relation (Age of Onset)    Breast cancer Maternal Aunt    Cancer Maternal Uncle        Tobacco Use    Smoking status: Never Smoker   Substance and Sexual Activity    Alcohol use: No    Drug use: Not on file    Sexual activity: Never     Birth control/protection: Surgical     Psychotherapeutics (From admission, onward)    Start     Stop Route Frequency Ordered    04/28/19 1200  paliperidone palmitate injection 156 mg      -- IM Once 04/25/19 1143    04/24/19 0900  paliperidone 24 hr tablet 6 mg      -- Oral Daily 04/23/19 0955    04/18/19 0421  OLANZapine tablet 10 mg  (Olanzapine)      -- Oral 3 times daily PRN 04/18/19 0421    04/18/19 0421  OLANZapine injection 10 mg  (Olanzapine)      -- IM 3 times daily PRN 04/18/19 0421           Review of Systems   Constitutional: Negative for fever.   HENT: Negative for congestion.      Objective:     Vital Signs (Most Recent):  Temp: 98.2 °F (36.8 °C) (04/28/19 0800)  Pulse: 99 (04/28/19 0800)  Resp: 16 (04/28/19 0800)  BP: 105/71 (04/28/19 0800)  SpO2: 100 % (04/24/19 2007) Vital Signs (24h Range):  Temp:  [98.1 °F (36.7 °C)-98.2 °F (36.8 °C)] 98.2 °F (36.8 °C)  Pulse:  [92-99] 99  Resp:  [16] 16  BP: (105-112)/(61-71) 105/71     Height: 5' 6" (167.6 cm)  Weight: 63 kg (138 lb 14.2 oz)  Body mass index is 22.42 kg/m².    No intake or output data in the 24 hours ending 04/28/19 1004    Physical Exam            PSYCHIATRIC   Appearance: unremarkable, age appropriate, wearing wig with patches of hair missing to resemble pattern baldness  Arousal: alert  Behavior/Cooperation: cooperative  Speech: normal volume, monotone, rapid  Language: English, fluid  Mood: "good"  Affect: full, expansive intermittently  Thought Process: perseverative, evasive at times  Thought Content: denies SI/HI/AVH, +paranoia regarding her family members  Associations: no loose associations noted  Orientation: grossly intact  Memory: " Grossly intact  Fund of Knowledge: appropriate for education level  Attention Span/Concentration: Normal  Cognition: grossly intact  Insight: fair  Judgment: fair    Significant Labs:   Last 24 Hours:   Recent Lab Results     None        Significant Imaging: I have reviewed all pertinent imaging results/findings within the past 24 hours.

## 2019-04-28 NOTE — PLAN OF CARE
Problem: Adult Behavioral Health Plan of Care  Goal: Plan of Care Review  Outcome: Ongoing (interventions implemented as appropriate)  Patient thought processes improving but are still loose and disorganized.  Able to to ADL's.  Sleeping with some interruptions.  Eating well.   Medication compliant.   NO side effects noted.    Goal: Adheres to Safety Considerations for Self and Others  Outcome: Ongoing (interventions implemented as appropriate)  Adheres to safety rules and routines.    Goal: Optimized Coping Skills in Response to Life Stressors  Outcome: Ongoing (interventions implemented as appropriate)  Coping skills are very limited.   Goal: Develops/Participates in Therapeutic [place] to Support Successful Transition  Outcome: Ongoing (interventions implemented as appropriate)  Unable to effectively participate in the therapeutic alliance.     Problem: Cognitive Impairment (Psychotic Signs/Symptoms)  Goal: Improved Thought Clarity and Organization (Psychotic Signs/Symptoms)  Outcome: Ongoing (interventions implemented as appropriate)  Patient cont to be disorganized and is responding to internal stimuli    Problem: Sleep Impairment (Psychotic Signs/Symptoms)  Goal: Improved Sleep Hygiene (Psychotic Signs/Symptoms)  Outcome: Ongoing (interventions implemented as appropriate)  Still awaken in the night and starting talking to herself.

## 2019-04-28 NOTE — NURSING
Patient is up and visible this evening.  Ate snack.  She cont to be delusional and disorganized.  Cont to have paranoid delusions about people lying about her.  Slight decrease in talking to herself.  Focused on discharge and taking care of her son.  INsight poor. Medication complaint.  No acting out behavior.  Patients appetite is good.  Sleeping better. Will still wake up at times and lay in bed talking to herself.

## 2019-04-28 NOTE — PROGRESS NOTES
04/28/19 1030   [facility] Group Therapy   Group Name Exercise   Specific Interventions Skilled Activity Mild Exercises   Participation Level Active   Participation Quality Cooperative   Insight/Motivation Improved   Affect/Mood Display Appropriate

## 2019-04-28 NOTE — PLAN OF CARE
Problem: Adult Behavioral Health Plan of Care  Goal: Plan of Care Review  Outcome: Ongoing (interventions implemented as appropriate)  Pt remains under good behavioral control. She is calm, pleasant on approach. She is heard RIS at times. Pt is not agitated or hostile. She received her invega injection w/o complications. She reports that she prefers po medications. Education provided. No signs of distress noted or reported. Pt appears to become paranoid and suspicious while fixated on personal belongings. Reassurance provided. No agitation or hostility.   Goal: Develops/Participates in Therapeutic [place] to Support Successful Transition  Outcome: Ongoing (interventions implemented as appropriate)  Pt is observed going to groups and activities.     Problem: Cognitive Impairment (Psychotic Signs/Symptoms)  Goal: Improved Thought Clarity and Organization (Psychotic Signs/Symptoms)  Outcome: Ongoing (interventions implemented as appropriate)  Pt remains with poor insight into medical condition. She is focused on discharge.     Problem: Sleep Impairment (Psychotic Signs/Symptoms)  Goal: Improved Sleep Hygiene (Psychotic Signs/Symptoms)  Outcome: Ongoing (interventions implemented as appropriate)  Pt reports sleeping well last night. Pt is observed awake throughout the day. She ins instructed to avoid napping during the day.
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
## 2019-04-29 PROCEDURE — 25000003 PHARM REV CODE 250: Performed by: STUDENT IN AN ORGANIZED HEALTH CARE EDUCATION/TRAINING PROGRAM

## 2019-04-29 PROCEDURE — 12400001 HC PSYCH SEMI-PRIVATE ROOM

## 2019-04-29 PROCEDURE — 90853 PR GROUP PSYCHOTHERAPY: ICD-10-PCS | Mod: ,,, | Performed by: PSYCHOLOGIST

## 2019-04-29 PROCEDURE — 90853 GROUP PSYCHOTHERAPY: CPT | Mod: ,,, | Performed by: PSYCHOLOGIST

## 2019-04-29 PROCEDURE — 99231 PR SUBSEQUENT HOSPITAL CARE,LEVL I: ICD-10-PCS | Mod: ,,, | Performed by: PSYCHIATRY & NEUROLOGY

## 2019-04-29 PROCEDURE — 99231 SBSQ HOSP IP/OBS SF/LOW 25: CPT | Mod: ,,, | Performed by: PSYCHIATRY & NEUROLOGY

## 2019-04-29 RX ADMIN — THERA TABS 1 TABLET: TAB at 08:04

## 2019-04-29 RX ADMIN — FERROUS SULFATE TAB EC 325 MG (65 MG FE EQUIVALENT) 325 MG: 325 (65 FE) TABLET DELAYED RESPONSE at 08:04

## 2019-04-29 RX ADMIN — PALIPERIDONE 6 MG: 3 TABLET, EXTENDED RELEASE ORAL at 08:04

## 2019-04-29 RX ADMIN — FOLIC ACID 1 MG: 1 TABLET ORAL at 08:04

## 2019-04-29 NOTE — PROGRESS NOTES
Group Psychotherapy (PhD/LCSW)    Site: [facility]    Clinical status of patient: Inpatient    Date: 4/29/2019    Group Focus: Life Skills    Length of service: 51795 - 35-40 minutes    Number of patients in attendance: 8    Referred by: Acute Psychiatry Unit Treatment Team    Target symptoms: Psychosis    Patient's response to treatment: Active Listening     Progress toward goals: Progressing slowly    Interval History:  Pt appeared alert and attentive in group. Pt responded briefly, appropriately when prompted in a group discussion of how to cope with negative mood states.     Diagnosis:  Paranoid Schizophrenia    Plan: Continue treatment on [facility]

## 2019-04-29 NOTE — PROGRESS NOTES
04/28/19 2000   [facility] Group Therapy   Group Name Community Reintegration   Specific Interventions Current Events   Participation Level Appropriate   Participation Quality Cooperative   Insight/Motivation Improved   Affect/Mood Display Appropriate   Cognition Alert

## 2019-04-29 NOTE — PLAN OF CARE
Problem: Adult Behavioral Health Plan of Care  Goal: Plan of Care Review  Outcome: Ongoing (interventions implemented as appropriate)  Patient remains loose and disorganized.  Less frequently responding to internal stimuli.  Unable to engage in one to one conversation without interference of delusions.  She is medication complaint with no side effects reported.  Mood is labile.  Patient is easily verbally redirected. No agitation or aggression noted.  Unable to attend most groups due to delusional state.   Goal: Adheres to Safety Considerations for Self and Others  Outcome: Ongoing (interventions implemented as appropriate)  Adheres to safety rules and routines  Goal: Optimized Coping Skills in Response to Life Stressors  Outcome: Ongoing (interventions implemented as appropriate)  Patient unable to ID coping skills available.   Goal: Develops/Participates in Therapeutic [place] to Support Successful Transition  Outcome: Ongoing (interventions implemented as appropriate)  Unable to participate in the therapeutic alliance    Problem: Cognitive Impairment (Psychotic Signs/Symptoms)  Goal: Improved Thought Clarity and Organization (Psychotic Signs/Symptoms)  Outcome: Ongoing (interventions implemented as appropriate)  Patients thought process are disorganized and rambling.  Delusional content noted.      Problem: Sleep Impairment (Psychotic Signs/Symptoms)  Goal: Improved Sleep Hygiene (Psychotic Signs/Symptoms)  Outcome: Ongoing (interventions implemented as appropriate)  Patient unable to sleep at this time.  In the time out room responding to internal stimuli.

## 2019-04-29 NOTE — PROGRESS NOTES
04/29/19 0900 04/29/19 1000 04/29/19 1100   [facility] Group Therapy   Group Name Community Reintegration Education Education   Specific Interventions Current Events Relapse Prevention Guided Imagery/Relaxation   Participation Level Active;Appropriate Minimal;Active Minimal;Active   Participation Quality Cooperative Cooperative Cooperative   Insight/Motivation Good Limited Limited   Affect/Mood Display Blunted;Appropriate Appropriate Appropriate   Cognition Alert Alert Alert      04/29/19 1300   [facility] Group Therapy   Group Name Therapeutic Recreation   Specific Interventions Skilled Activity CraConnected   Participation Level  --    Participation Quality Drowsy;Lack of Interest   Insight/Motivation  --    Affect/Mood Display  --    Cognition  --
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
## 2019-04-29 NOTE — NURSING
Patient is still responding to internal stimuli.  Still verbalizing paranoid ideations about her family and making up lies.  Has agreed for treatment team to contact her father.  Medication compliant with no side effects noted.  Patient had an Invega Sustena injection today.  Shows limited insight into illness and aftercare needs.  Still very focused on discharge.  Still want to be with her son.

## 2019-04-29 NOTE — SUBJECTIVE & OBJECTIVE
"Interval History: see hospital course    Family History     Problem Relation (Age of Onset)    Breast cancer Maternal Aunt    Cancer Maternal Uncle        Tobacco Use    Smoking status: Never Smoker   Substance and Sexual Activity    Alcohol use: No    Drug use: Not on file    Sexual activity: Never     Birth control/protection: Surgical     Psychotherapeutics (From admission, onward)    Start     Stop Route Frequency Ordered    04/24/19 0900  paliperidone 24 hr tablet 6 mg      -- Oral Daily 04/23/19 0955    04/18/19 0421  OLANZapine tablet 10 mg  (Olanzapine)      -- Oral 3 times daily PRN 04/18/19 0421    04/18/19 0421  OLANZapine injection 10 mg  (Olanzapine)      -- IM 3 times daily PRN 04/18/19 0421           Review of Systems   Constitutional: Negative for fever.   HENT: Negative for congestion.      Objective:     Vital Signs (Most Recent):  Temp: 98.1 °F (36.7 °C) (04/29/19 0711)  Pulse: (!) 118 (04/29/19 0711)  Resp: 18 (04/29/19 0711)  BP: (!) 109/57 (04/29/19 0711)  SpO2: 100 % (04/24/19 2007) Vital Signs (24h Range):  Temp:  [97.9 °F (36.6 °C)-98.1 °F (36.7 °C)] 98.1 °F (36.7 °C)  Pulse:  [] 118  Resp:  [16-18] 18  BP: (106-109)/(57-66) 109/57     Height: 5' 6" (167.6 cm)  Weight: 63 kg (138 lb 14.2 oz)  Body mass index is 22.42 kg/m².    No intake or output data in the 24 hours ending 04/29/19 1027    Physical Exam       PSYCHIATRIC   Appearance: unremarkable, age appropriate, wearing wig with patches of hair missing   Arousal: alert  Behavior/Cooperation: psychomotor agitation  Speech: normal volume, monotone, rapid  Language: English, fluid  Mood: "pretty good"  Affect: blunted  Thought Process: perseverative, evasive at times  Thought Content: denies SI/HI/AVH, +paranoia regarding her family members  Associations: no loose associations noted  Orientation: grossly intact  Memory: Grossly intact  Fund of Knowledge: appropriate for education level  Attention Span/Concentration: " Normal  Cognition: grossly intact  Insight: fair  Judgment: fair    Significant Labs:   Last 24 Hours:   Recent Lab Results     None        Significant Imaging: I have reviewed all pertinent imaging results/findings within the past 24 hours.

## 2019-04-29 NOTE — NURSING
Patient in the bathroom responding to internal stimuli.  Talking loudly to herself.  Cannot be verbally redirected.  Patient agreed to move to the time out room until she was able to quiet down.  Patient roommate was unable to sleep.
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
## 2019-04-29 NOTE — NURSING
Spoke with patient's brother, Dylon ([phone]). He states patient can return to their home. Discussed options for follow up care, including home health. Brother is open to home health. He doesn't want patient to keep coming into hospital and he wants her to be compliant with her medication. Discussed DORAN with brother and he feels this is appropriate. He is worried that patient will continue to get worse, and wants to get a power of . Explained that we would not be able to do that here. He understands.    Spoke with Renae in admissions at [facility] [phone]). They requested clinicals and stated they should be able to follow patient in home. They are able to give DORAN in the home. Will fax admission referral.

## 2019-04-29 NOTE — PROGRESS NOTES
"                    Medical Nutrition Therapy      Reason for Assessment: LOS  Dx:Schizophrenia, chronic condition with acute exacerbation    Medical Hx:  Past Medical History:   Diagnosis Date    Anemia     Mental disorder     Schizophrenia          Interdisciplinary Rounds: Did not Attend  Discharge planning: Regular diet  w/ PO intake >75%    General Info: Pt with no changes to appetite or weight PTA. No concern for malnutrition at this time.    Current Diet: Regular    Ht:  Ht Readings from Last 1 Encounters:   04/18/19 5' 6" (1.676 m)     Wt:  Wt Readings from Last 1 Encounters:   04/18/19 63 kg (138 lb 14.2 oz)     Body mass index is 22.42 kg/m².      Labs: Reviewed  CMP  Sodium   Date Value Ref Range Status   04/17/2019 138 136 - 145 mmol/L Final     Potassium   Date Value Ref Range Status   04/17/2019 3.5 3.5 - 5.1 mmol/L Final     Glucose   Date Value Ref Range Status   04/17/2019 83 70 - 110 mg/dL Final     BUN, Bld   Date Value Ref Range Status   04/17/2019 10 6 - 20 mg/dL Final     Creatinine   Date Value Ref Range Status   04/17/2019 0.7 0.5 - 1.4 mg/dL Final     Calcium   Date Value Ref Range Status   04/17/2019 9.5 8.7 - 10.5 mg/dL Final     Total Protein   Date Value Ref Range Status   04/17/2019 7.2 6.0 - 8.4 g/dL Final     Albumin   Date Value Ref Range Status   04/17/2019 3.9 3.5 - 5.2 g/dL Final     Total Bilirubin   Date Value Ref Range Status   04/17/2019 0.8 0.1 - 1.0 mg/dL Final     Comment:     For infants and newborns, interpretation of results should be based  on gestational age, weight and in agreement with clinical  observations.  Premature Infant recommended reference ranges:  Up to 24 hours.............<8.0 mg/dL  Up to 48 hours............<12.0 mg/dL  3-5 days..................<15.0 mg/dL  6-29 days.................<15.0 mg/dL       Alkaline Phosphatase   Date Value Ref Range Status   04/17/2019 60 55 - 135 U/L Final     AST   Date Value Ref Range Status   04/17/2019 19 10 - 40 U/L " Final     ALT   Date Value Ref Range Status   04/17/2019 12 10 - 44 U/L Final     eGFR if    Date Value Ref Range Status   04/17/2019 >60.0 >60 mL/min/1.73 m^2 Final     eGFR if non    Date Value Ref Range Status   04/17/2019 >60.0 >60 mL/min/1.73 m^2 Final     Comment:     Calculation used to obtain the estimated glomerular filtration  rate (eGFR) is the CKD-EPI equation.          Meds: Reviewed  Scheduled Meds:   ferrous sulfate  325 mg Oral Daily    folic acid  1 mg Oral Daily    multivitamin  1 tablet Oral Daily    paliperidone  6 mg Oral Daily     PRN Meds:.docusate sodium, ibuprofen, loperamide, nicotine, OLANZapine **AND** OLANZapine    Overall Physical Appearance: Well Nourished    Level of Risk: Low    Nutrition Dx: No nutrition diagnosis at this time     Next Follow Up Date: 1x/wk

## 2019-04-30 PROCEDURE — 12400001 HC PSYCH SEMI-PRIVATE ROOM

## 2019-04-30 PROCEDURE — 90853 GROUP PSYCHOTHERAPY: CPT | Mod: ,,, | Performed by: PSYCHOLOGIST

## 2019-04-30 PROCEDURE — 90853 PR GROUP PSYCHOTHERAPY: ICD-10-PCS | Mod: ,,, | Performed by: PSYCHOLOGIST

## 2019-04-30 PROCEDURE — 99233 SBSQ HOSP IP/OBS HIGH 50: CPT | Mod: GC,,, | Performed by: PSYCHIATRY & NEUROLOGY

## 2019-04-30 PROCEDURE — 99233 PR SUBSEQUENT HOSPITAL CARE,LEVL III: ICD-10-PCS | Mod: GC,,, | Performed by: PSYCHIATRY & NEUROLOGY

## 2019-04-30 PROCEDURE — 25000003 PHARM REV CODE 250: Performed by: STUDENT IN AN ORGANIZED HEALTH CARE EDUCATION/TRAINING PROGRAM

## 2019-04-30 RX ADMIN — THERA TABS 1 TABLET: TAB at 08:04

## 2019-04-30 RX ADMIN — FOLIC ACID 1 MG: 1 TABLET ORAL at 08:04

## 2019-04-30 RX ADMIN — FERROUS SULFATE TAB EC 325 MG (65 MG FE EQUIVALENT) 325 MG: 325 (65 FE) TABLET DELAYED RESPONSE at 08:04

## 2019-04-30 RX ADMIN — PALIPERIDONE 6 MG: 3 TABLET, EXTENDED RELEASE ORAL at 08:04
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
## 2019-04-30 NOTE — PLAN OF CARE
04/29/19 1600   [facility] Group Therapy   Group Name Medication   Participation Level Appropriate   Participation Quality Cooperative   Insight/Motivation Limited   Affect/Mood Display Appropriate   Cognition Alert

## 2019-04-30 NOTE — SUBJECTIVE & OBJECTIVE
"Interval History: see hospital course    Family History     Problem Relation (Age of Onset)    Breast cancer Maternal Aunt    Cancer Maternal Uncle        Tobacco Use    Smoking status: Never Smoker   Substance and Sexual Activity    Alcohol use: No    Drug use: Not on file    Sexual activity: Never     Birth control/protection: Surgical     Psychotherapeutics (From admission, onward)    Start     Stop Route Frequency Ordered    04/18/19 0421  OLANZapine tablet 10 mg  (Olanzapine)      -- Oral 3 times daily PRN 04/18/19 0421 04/18/19 0421  OLANZapine injection 10 mg  (Olanzapine)      -- IM 3 times daily PRN 04/18/19 0421           Review of Systems   Constitutional: Negative for fever.   HENT: Negative for congestion.      Objective:     Vital Signs (Most Recent):  Temp: 97.9 °F (36.6 °C) (04/30/19 0715)  Pulse: (!) 141 (04/30/19 0715)  Resp: 17 (04/30/19 0715)  BP: 102/63 (04/30/19 0715)  SpO2: 100 % (04/24/19 2007) Vital Signs (24h Range):  Temp:  [97.9 °F (36.6 °C)] 97.9 °F (36.6 °C)  Pulse:  [101-141] 141  Resp:  [17-18] 17  BP: (102-118)/(58-63) 102/63     Height: 5' 6" (167.6 cm)  Weight: 63 kg (138 lb 14.2 oz)  Body mass index is 22.42 kg/m².    No intake or output data in the 24 hours ending 04/30/19 1252    Physical Exam       PSYCHIATRIC   Appearance: unremarkable, age appropriate, wearing wig with patches of hair missing   Arousal: alert  Behavior/Cooperation: calm, superficially cooperative, psychomotor agitation  Speech: normal volume, monotone, rapid  Language: English, fluid  Mood: "pretty good"  Affect: blunted  Thought Process: perseverative, evasive at times  Thought Content: denies SI/HI/AVH, +paranoia regarding her family members  Associations: no loose associations noted  Orientation: grossly intact  Memory: Grossly intact  Fund of Knowledge: appropriate for education level  Attention Span/Concentration: Normal  Cognition: grossly intact  Insight: fair  Judgment: fair    Significant " Labs:   Last 24 Hours:   Recent Lab Results     None        Significant Imaging: I have reviewed all pertinent imaging results/findings within the past 24 hours.

## 2019-04-30 NOTE — PLAN OF CARE
Problem: Adult Behavioral Health Plan of Care  Goal: Plan of Care Review  Outcome: Ongoing (interventions implemented as appropriate)  Compliant with scheduled evening activities.  Appears disorganized and restless, was observed talking to self aloud at times.  Redirectable.  No physical complaints reported or noted this evening.  Retired to bed at appropriate time.  Safety maintained.

## 2019-04-30 NOTE — PROGRESS NOTES
"Group Psychotherapy (PhD/LCSW)    Site: [facility]    Clinical status of patient: Inpatient    Date: 4/30/2019    Group Focus: Life Skills    Length of service: 88043 - 35-40 minutes    Number of patients in attendance: 9    Referred by: Acute Psychiatry Unit Treatment Team    Target symptoms: Psychosis    Patient's response to treatment: Active Listening; Self-disclosure      Progress toward goals: Progressing slowly    Interval History:  Pt appeared alert and attentive in group. Pt participated appropriately in a discussion of the differences between "recovery" and "cure" in regard to how they apply to significant, recurrent problems in mental illness and substance problems such as recurrent depression, bipolar d/o, ptsd, addiction, etc. Noted the way the recovery model may be more helpful in sustaining treatment compliance and long-term progress.     Diagnosis:  Paranoid Schizophrenia    Plan: Continue treatment on APU        "

## 2019-04-30 NOTE — NURSING
Appears to have slept approximately 6-7 hours thus far this shift.  See Observation Checklist.  Safety maintained throughout shift.
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
## 2019-04-30 NOTE — PROGRESS NOTES
04/29/19 2000   [facility] Group Therapy   Group Name Community Reintegration   Specific Interventions Cognitive Stimulation Training   Participation Level Appropriate   Participation Quality Cooperative;Needs Redirection   Insight/Motivation Limited   Affect/Mood Display Appropriate   Cognition Alert

## 2019-05-01 PROBLEM — R00.0 TACHYCARDIA: Status: ACTIVE | Noted: 2019-05-01

## 2019-05-01 LAB
BASOPHILS # BLD AUTO: 0.02 K/UL (ref 0–0.2)
BASOPHILS NFR BLD: 0.4 % (ref 0–1.9)
DIFFERENTIAL METHOD: ABNORMAL
EOSINOPHIL # BLD AUTO: 0 K/UL (ref 0–0.5)
EOSINOPHIL NFR BLD: 0.4 % (ref 0–8)
ERYTHROCYTE [DISTWIDTH] IN BLOOD BY AUTOMATED COUNT: 16 % (ref 11.5–14.5)
HCT VFR BLD AUTO: 33.6 % (ref 37–48.5)
HGB BLD-MCNC: 10.3 G/DL (ref 12–16)
IMM GRANULOCYTES # BLD AUTO: 0.01 K/UL (ref 0–0.04)
IMM GRANULOCYTES NFR BLD AUTO: 0.2 % (ref 0–0.5)
LYMPHOCYTES # BLD AUTO: 1.9 K/UL (ref 1–4.8)
LYMPHOCYTES NFR BLD: 35.5 % (ref 18–48)
MCH RBC QN AUTO: 25.9 PG (ref 27–31)
MCHC RBC AUTO-ENTMCNC: 30.7 G/DL (ref 32–36)
MCV RBC AUTO: 85 FL (ref 82–98)
MONOCYTES # BLD AUTO: 0.3 K/UL (ref 0.3–1)
MONOCYTES NFR BLD: 5.7 % (ref 4–15)
NEUTROPHILS # BLD AUTO: 3.1 K/UL (ref 1.8–7.7)
NEUTROPHILS NFR BLD: 57.8 % (ref 38–73)
NRBC BLD-RTO: 0 /100 WBC
PLATELET # BLD AUTO: 306 K/UL (ref 150–350)
PMV BLD AUTO: 8.9 FL (ref 9.2–12.9)
RBC # BLD AUTO: 3.97 M/UL (ref 4–5.4)
WBC # BLD AUTO: 5.3 K/UL (ref 3.9–12.7)

## 2019-05-01 PROCEDURE — 36415 COLL VENOUS BLD VENIPUNCTURE: CPT

## 2019-05-01 PROCEDURE — 93005 ELECTROCARDIOGRAM TRACING: CPT

## 2019-05-01 PROCEDURE — 99233 PR SUBSEQUENT HOSPITAL CARE,LEVL III: ICD-10-PCS | Mod: GC,,, | Performed by: PSYCHIATRY & NEUROLOGY

## 2019-05-01 PROCEDURE — 90853 PR GROUP PSYCHOTHERAPY: ICD-10-PCS | Mod: ,,, | Performed by: PSYCHOLOGIST

## 2019-05-01 PROCEDURE — 90853 GROUP PSYCHOTHERAPY: CPT | Mod: ,,, | Performed by: PSYCHOLOGIST

## 2019-05-01 PROCEDURE — 99222 1ST HOSP IP/OBS MODERATE 55: CPT | Mod: GC,,, | Performed by: HOSPITALIST

## 2019-05-01 PROCEDURE — 85025 COMPLETE CBC W/AUTO DIFF WBC: CPT

## 2019-05-01 PROCEDURE — 99222 PR INITIAL HOSPITAL CARE,LEVL II: ICD-10-PCS | Mod: GC,,, | Performed by: HOSPITALIST

## 2019-05-01 PROCEDURE — 25000003 PHARM REV CODE 250: Performed by: PSYCHIATRY & NEUROLOGY

## 2019-05-01 PROCEDURE — 93010 ELECTROCARDIOGRAM REPORT: CPT | Mod: ,,, | Performed by: INTERNAL MEDICINE

## 2019-05-01 PROCEDURE — 25000003 PHARM REV CODE 250: Performed by: STUDENT IN AN ORGANIZED HEALTH CARE EDUCATION/TRAINING PROGRAM

## 2019-05-01 PROCEDURE — 93010 EKG 12-LEAD: ICD-10-PCS | Mod: ,,, | Performed by: INTERNAL MEDICINE

## 2019-05-01 PROCEDURE — 99233 SBSQ HOSP IP/OBS HIGH 50: CPT | Mod: GC,,, | Performed by: PSYCHIATRY & NEUROLOGY

## 2019-05-01 PROCEDURE — 12400001 HC PSYCH SEMI-PRIVATE ROOM

## 2019-05-01 RX ORDER — FERROUS SULFATE 325(65) MG
325 TABLET, DELAYED RELEASE (ENTERIC COATED) ORAL 3 TIMES DAILY
Status: DISCONTINUED | OUTPATIENT
Start: 2019-05-01 | End: 2019-05-06 | Stop reason: HOSPADM

## 2019-05-01 RX ADMIN — FERROUS SULFATE TAB EC 325 MG (65 MG FE EQUIVALENT) 325 MG: 325 (65 FE) TABLET DELAYED RESPONSE at 08:05

## 2019-05-01 RX ADMIN — THERA TABS 1 TABLET: TAB at 08:05

## 2019-05-01 RX ADMIN — FERROUS SULFATE TAB EC 325 MG (65 MG FE EQUIVALENT) 325 MG: 325 (65 FE) TABLET DELAYED RESPONSE at 03:05

## 2019-05-01 RX ADMIN — FOLIC ACID 1 MG: 1 TABLET ORAL at 08:05
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
## 2019-05-01 NOTE — PROGRESS NOTES
05/01/19 0900 05/01/19 1000 05/01/19 1100   [facility] Group Therapy   Group Name Community Reintegration Education   (pet therapy)   Specific Interventions Current Events Assertiveness Training Sensory Stimulation   Participation Level Active;Appropriate Minimal Minimal   Participation Quality Cooperative Cooperative Cooperative   Insight/Motivation Good;Limited Limited Limited   Affect/Mood Display Appropriate Appropriate Appropriate   Cognition Alert Alert Alert      05/01/19 1200   [facility] Group Therapy   Group Name Therapeutic Recreation   Specific Interventions Skilled Activity Crafts   Participation Level Minimal   Participation Quality Cooperative   Insight/Motivation Good   Affect/Mood Display Appropriate   Cognition Alert

## 2019-05-01 NOTE — PLAN OF CARE
04/30/19 1600   [facility] Group Therapy   Group Name Education  (NURSING/ SW GROUP/BOUNDARIES)   Specific Interventions Coping Skills Training   Participation Level Active   Participation Quality Cooperative   Insight/Motivation Good   Affect/Mood Display Appropriate   Cognition Alert

## 2019-05-01 NOTE — ASSESSMENT & PLAN NOTE
- Patient with tachycardia (up to 141) over past several days; not elevated on admission   - Reports hx of fast heart rate but cannot explain further   - still with some psychomotor agitation   - Have consulted internal medicine

## 2019-05-01 NOTE — PROGRESS NOTES
Group Psychotherapy (PhD/LCSW)    Site: [facility]    Clinical status of patient: Inpatient    Date: 5/1/2019    Group Focus: Life Skills    Length of service: 40628 - 35-40 minutes    Number of patients in attendance: 9    Referred by: Acute Psychiatry Unit Treatment Team    Target symptoms: Psychosis    Patient's response to treatment: Active Listening; Self-disclosure      Progress toward goals: Progressing slowly    Interval History:  Pt appeared alert and attentive in group. Pt participated briefly, appropriately when prompted in a group discussion of communication skills with an emphasis on the how to construct and use I-messages to enhance the communication process.     Diagnosis:  Paranoid Schizophrenia    Plan: Continue treatment on APU

## 2019-05-01 NOTE — PLAN OF CARE
Problem: Adult Behavioral Health Plan of Care  Goal: Plan of Care Review  Outcome: Ongoing (interventions implemented as appropriate)  Patient sitting up in dining room. Patient tolerating am diet well. Safety maintained. Will continue to monitor.

## 2019-05-01 NOTE — CONSULTS
[facility] Hospital Medicine  Consult Note    Patient Name: Faye Khalil  MRN: [id number]  Admission Date: 4/18/2019  Hospital Length of Stay: 13 days  Attending Physician: Terence Robles Jr., MD   Primary Care Provider: Marco Olivarez NP     Hospital Medicine Team: Networked reference to record PCT  Mahamed Amaro MD      Patient information was obtained from patient, past medical records and ER records.     Inpatient consult to Hospital Medicine-General  Consult performed by: Mahamed Amaro MD  Consult ordered by: Erica Cooney MD        Subjective:     Principal Problem: Schizophrenia, chronic condition with acute exacerbation    Chief Complaint: No chief complaint on file.       HPI: Mrs. Khalil is a 32 yo F with a PMH of anemia and schizophrenia who presented to the ER via EMS for evaluation of suicidal thoughts.  Mobile crisis unit states that patient is suicidal and is a formal voluntary admission.  Pt has been calm and cooperative during her inpatient stay.  Hospital Medicine consulted for persistent tachycardia. Pt has been tachycardic over the past 2 days with pulses in the 100s to 110s. Pt reports that she has had long standing high heart rate for many years. She reports she occasionally with feel a palpation but denies ever having any chest pain or shortness of breath. She has not been able to follow up with a doctor for her medical care as of recent.  She denies any cocaine, alcohol, or any other drug abuse. Pt reports she has been anemic for sometime. She has not been taking iron supplement at home per history. She has only been taking her multivitamin. Pt does not report abnormal bleeding during periods and reports they last 3-5 days or less.       Past Medical History:   Diagnosis Date    Anemia     Mental disorder     Schizophrenia        Past Surgical History:   Procedure Laterality Date    LIGATION, FALLOPIAN TUBE, LAPAROSCOPIC Bilateral 5/8/2014     Performed by Lissy Urena [facility] at [facility]    TUBAL LIGATION         Review of patient's allergies indicates:  No Known Allergies    No current facility-administered medications on file prior to encounter.      Current Outpatient Medications on File Prior to Encounter   Medication Sig    benztropine (COGENTIN) 1 MG tablet Take 1 mg by mouth every evening.     ferrous sulfate 324 mg (65 mg iron) TbEC Take 325 mg by mouth once daily.    INVEGA SUSTENNA 117 mg/0.75 mL Syrg every 30 days.     naproxen sodium (ANAPROX) 550 MG tablet Take 1 tablet (550 mg total) by mouth every 12 (twelve) hours as needed.    paroxetine (PAXIL) 10 MG tablet Take 10 mg by mouth every evening.      Family History     Problem Relation (Age of Onset)    Breast cancer Maternal Aunt    Cancer Maternal Uncle        Tobacco Use    Smoking status: Never Smoker   Substance and Sexual Activity    Alcohol use: No    Drug use: Not on file    Sexual activity: Never     Birth control/protection: Surgical     Review of Systems   Constitutional: Negative for chills, fatigue, fever and unexpected weight change.   HENT: Negative for congestion, rhinorrhea, sinus pressure, sinus pain, sore throat and voice change.    Eyes: Negative for photophobia and visual disturbance.   Respiratory: Negative for apnea, cough, choking and wheezing.    Cardiovascular: Negative for chest pain and palpitations (non recently but occasional palpation in the past).   Gastrointestinal: Negative for abdominal distention and abdominal pain.   Genitourinary: Negative for difficulty urinating.   Musculoskeletal: Negative for arthralgias and back pain.   Neurological: Negative for dizziness, weakness and headaches.     Objective:     Vital Signs (Most Recent):  Temp: 97.7 °F (36.5 °C) (05/01/19 0715)  Pulse: (!) 112 (05/01/19 0715)  Resp: 18 (05/01/19 0715)  BP: (!) 111/58 (05/01/19 0715)  SpO2: 100 % (04/30/19 1917) Vital Signs (24h Range):  Temp:  [97.7 °F (36.5  °C)-98.3 °F (36.8 °C)] 97.7 °F (36.5 °C)  Pulse:  [106-112] 112  Resp:  [18] 18  SpO2:  [100 %] 100 %  BP: (111-120)/(57-58) 111/58     Weight: 63 kg (138 lb 14.2 oz)  Body mass index is 22.42 kg/m².    Physical Exam   Constitutional: No distress.   HENT:   Head: Atraumatic.   Mouth/Throat: Oropharynx is clear and moist.   Eyes: Pupils are equal, round, and reactive to light. Conjunctivae and EOM are normal.   Neck: Normal range of motion. Neck supple. No JVD present. No tracheal deviation present.   Cardiovascular: Normal heart sounds and intact distal pulses.   Elevated HR   Pulmonary/Chest: Effort normal and breath sounds normal. No respiratory distress. She has no wheezes.   Abdominal: Soft. Bowel sounds are normal. She exhibits no distension.   Musculoskeletal: Normal range of motion.   Neurological: She is alert.   Skin: Skin is warm and dry.       Significant Labs:   CBC:   Recent Labs   Lab 05/01/19  1018   WBC 5.30   HGB 10.3*   HCT 33.6*        CMP: No results for input(s): NA, K, CL, CO2, GLU, BUN, CREATININE, CALCIUM, PROT, ALBUMIN, BILITOT, ALKPHOS, AST, ALT, ANIONGAP, EGFRNONAA in the last 48 hours.    Invalid input(s): ESTGFAFRICA    Significant Imaging: I have reviewed and interpreted all pertinent imaging results/findings within the past 24 hours.    Assessment/Plan:     Tachycardia  Pt reports long standing history of tachycardia. Pt says HR is generally in the low 100s. Pt denies having chest pain or ever seeing a cardiologist for her elevated HR. She denies any SOB, dizziness, lightheadedness, fatigue. She has also had long standing anemia but has not been taking iron supplements. Additionally, pt reports significant anxiety and is worried about her health and her discharge plan / future. EKG from this morning was normal sinus rhythm. Possible causes could include anemia, anxiety, menorrhagia.    Plan  Ferrous sulfate tablets TID  Metoprolol succinate (toprol-xl) start at low dose of 12.5  mg and titrate up 25 mg if needed  Recommend PCP follow up at - [facility] for both PCP and GYN care          VTE Risk Mitigation (From admission, onward)    None              Thank you for your consult. Please contact us if you have any additional questions.    Mahamed Amaro MD  Department of Hospital Medicine   [facility]

## 2019-05-01 NOTE — ASSESSMENT & PLAN NOTE
Pt reports long standing history of tachycardia. Pt says HR is generally in the low 100s. Pt denies having chest pain or ever seeing a cardiologist for her elevated HR. She denies any SOB, dizziness, lightheadedness, fatigue. She has also had long standing anemia but has not been taking iron supplements. Additionally, pt reports significant anxiety and is worried about her health and her discharge plan / future. Possible causes could include anemia, anxiety, Menorrhagia.    Plan  Ferrous sulfate tablets TID  Metoprolol succinate (toprol-xl) start at low dose of 12.5 and titrate up 25 if needed  Recommend PCP follow up at - [facility] for both PCP and GYN care

## 2019-05-01 NOTE — SUBJECTIVE & OBJECTIVE
"Interval History: see hospital course     Family History     Problem Relation (Age of Onset)    Breast cancer Maternal Aunt    Cancer Maternal Uncle        Tobacco Use    Smoking status: Never Smoker   Substance and Sexual Activity    Alcohol use: No    Drug use: Not on file    Sexual activity: Never     Birth control/protection: Surgical     Psychotherapeutics (From admission, onward)    Start     Stop Route Frequency Ordered    04/18/19 0421  OLANZapine tablet 10 mg  (Olanzapine)      -- Oral 3 times daily PRN 04/18/19 0421 04/18/19 0421  OLANZapine injection 10 mg  (Olanzapine)      -- IM 3 times daily PRN 04/18/19 0421           Review of Systems   Cardiovascular: Negative for chest pain.   Gastrointestinal: Negative for abdominal pain.     Objective:     Vital Signs (Most Recent):  Temp: 97.7 °F (36.5 °C) (05/01/19 0715)  Pulse: (!) 112 (05/01/19 0715)  Resp: 18 (05/01/19 0715)  BP: (!) 111/58 (05/01/19 0715)  SpO2: 100 % (04/30/19 1917) Vital Signs (24h Range):  Temp:  [97.7 °F (36.5 °C)-98.3 °F (36.8 °C)] 97.7 °F (36.5 °C)  Pulse:  [106-112] 112  Resp:  [18] 18  SpO2:  [100 %] 100 %  BP: (111-120)/(57-58) 111/58     Height: 5' 6" (167.6 cm)  Weight: 63 kg (138 lb 14.2 oz)  Body mass index is 22.42 kg/m².    No intake or output data in the 24 hours ending 05/01/19 0930    Physical Exam     Mental Status Exam:  Appearance: age appropriate, disheveled  Behavior/Cooperation: cooperative, psychomotor agitation, restless and fidgety   Speech: rapid, mumbled  Mood: "alright"  Affect: blunted  Thought Process: tangential  Thought Content: Denies SI/HI/AVH. + paranoid delusions regarding family memebers   Orientation: grossly intact  Memory: Grossly intact  Attention Span/Concentration: Impaired to some degree  Insight: poor, minimal insight into illness   Judgment: limited       Significant Labs:   Last 24 Hours:   Recent Lab Results     None          Significant Imaging: None  "

## 2019-05-01 NOTE — ASSESSMENT & PLAN NOTE
- Shun 28, Fe 48, Fe sat 12, TIBC 389  - will increase to ferrous sulfate 325 mg PO TID per internal medicine recommendations (5/1)
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
## 2019-05-01 NOTE — SUBJECTIVE & OBJECTIVE
Past Medical History:   Diagnosis Date    Anemia     Mental disorder     Schizophrenia        Past Surgical History:   Procedure Laterality Date    LIGATION, FALLOPIAN TUBE, LAPAROSCOPIC Bilateral 5/8/2014    Performed by Lissy Urena MD at [facility] OR    TUBAL LIGATION         Review of patient's allergies indicates:  No Known Allergies    No current facility-administered medications on file prior to encounter.      Current Outpatient Medications on File Prior to Encounter   Medication Sig    benztropine (COGENTIN) 1 MG tablet Take 1 mg by mouth every evening.     ferrous sulfate 324 mg (65 mg iron) TbEC Take 325 mg by mouth once daily.    INVEGA SUSTENNA 117 mg/0.75 mL Syrg every 30 days.     naproxen sodium (ANAPROX) 550 MG tablet Take 1 tablet (550 mg total) by mouth every 12 (twelve) hours as needed.    paroxetine (PAXIL) 10 MG tablet Take 10 mg by mouth every evening.      Family History     Problem Relation (Age of Onset)    Breast cancer Maternal Aunt    Cancer Maternal Uncle        Tobacco Use    Smoking status: Never Smoker   Substance and Sexual Activity    Alcohol use: No    Drug use: Not on file    Sexual activity: Never     Birth control/protection: Surgical     Review of Systems   Constitutional: Negative for chills, fatigue, fever and unexpected weight change.   HENT: Negative for congestion, rhinorrhea, sinus pressure, sinus pain, sore throat and voice change.    Eyes: Negative for photophobia and visual disturbance.   Respiratory: Negative for apnea, cough, choking and wheezing.    Cardiovascular: Negative for chest pain and palpitations (non recently but occasional palpation in the past).   Gastrointestinal: Negative for abdominal distention and abdominal pain.   Genitourinary: Negative for difficulty urinating.   Musculoskeletal: Negative for arthralgias and back pain.   Neurological: Negative for dizziness, weakness and headaches.     Objective:     Vital Signs (Most  Recent):  Temp: 97.7 °F (36.5 °C) (05/01/19 0715)  Pulse: (!) 112 (05/01/19 0715)  Resp: 18 (05/01/19 0715)  BP: (!) 111/58 (05/01/19 0715)  SpO2: 100 % (04/30/19 1917) Vital Signs (24h Range):  Temp:  [97.7 °F (36.5 °C)-98.3 °F (36.8 °C)] 97.7 °F (36.5 °C)  Pulse:  [106-112] 112  Resp:  [18] 18  SpO2:  [100 %] 100 %  BP: (111-120)/(57-58) 111/58     Weight: 63 kg (138 lb 14.2 oz)  Body mass index is 22.42 kg/m².    Physical Exam   Constitutional: No distress.   HENT:   Head: Atraumatic.   Mouth/Throat: Oropharynx is clear and moist.   Eyes: Pupils are equal, round, and reactive to light. Conjunctivae and EOM are normal.   Neck: Normal range of motion. Neck supple. No JVD present. No tracheal deviation present.   Cardiovascular: Normal heart sounds and intact distal pulses.   Elevated HR   Pulmonary/Chest: Effort normal and breath sounds normal. No respiratory distress. She has no wheezes.   Abdominal: Soft. Bowel sounds are normal. She exhibits no distension.   Musculoskeletal: Normal range of motion.   Neurological: She is alert.   Skin: Skin is warm and dry.       Significant Labs:   CBC:   Recent Labs   Lab 05/01/19  1018   WBC 5.30   HGB 10.3*   HCT 33.6*        CMP: No results for input(s): NA, K, CL, CO2, GLU, BUN, CREATININE, CALCIUM, PROT, ALBUMIN, BILITOT, ALKPHOS, AST, ALT, ANIONGAP, EGFRNONAA in the last 48 hours.    Invalid input(s): ESTGFAFRICA    Significant Imaging: I have reviewed and interpreted all pertinent imaging results/findings within the past 24 hours.

## 2019-05-01 NOTE — HPI
Mrs. Khalil is a 34 yo F with a PMH of anemia and schizophrenia who presented to the ER via EMS for evaluation of suicidal thoughts.  Mobile crisis unit states that patient is suicidal and is a formal voluntary admission.  Pt has been calm and cooperative during her inpatient stay.  Hospital Medicine consulted for persistent tachycardia. Pt has been tachycardic over the past 2 days with pulses in the 100s to 110s. Pt reports that she has had long standing high heart rate for many years. She reports she occasionally with feel a palpation but denies ever having any chest pain or shortness of breath. She has not been able to follow up with a doctor for her medical care as of recent.  She denies any cocaine, alcohol, or any other drug abuse. Pt reports she has been anemic for sometime. She has not been taking iron supplement at home per history. She has only been taking her multivitamin.

## 2019-05-02 PROCEDURE — 97150 GROUP THERAPEUTIC PROCEDURES: CPT

## 2019-05-02 PROCEDURE — 99233 PR SUBSEQUENT HOSPITAL CARE,LEVL III: ICD-10-PCS | Mod: GC,,, | Performed by: PSYCHIATRY & NEUROLOGY

## 2019-05-02 PROCEDURE — 12400001 HC PSYCH SEMI-PRIVATE ROOM

## 2019-05-02 PROCEDURE — 90853 PR GROUP PSYCHOTHERAPY: ICD-10-PCS | Mod: ,,, | Performed by: PSYCHOLOGIST

## 2019-05-02 PROCEDURE — 25000003 PHARM REV CODE 250: Performed by: PSYCHIATRY & NEUROLOGY

## 2019-05-02 PROCEDURE — 99233 SBSQ HOSP IP/OBS HIGH 50: CPT | Mod: GC,,, | Performed by: PSYCHIATRY & NEUROLOGY

## 2019-05-02 PROCEDURE — 25000003 PHARM REV CODE 250: Performed by: STUDENT IN AN ORGANIZED HEALTH CARE EDUCATION/TRAINING PROGRAM

## 2019-05-02 PROCEDURE — 90853 GROUP PSYCHOTHERAPY: CPT | Mod: ,,, | Performed by: PSYCHOLOGIST

## 2019-05-02 RX ORDER — PALIPERIDONE 3 MG/1
3 TABLET, EXTENDED RELEASE ORAL DAILY
Status: DISCONTINUED | OUTPATIENT
Start: 2019-05-02 | End: 2019-05-06 | Stop reason: HOSPADM

## 2019-05-02 RX ADMIN — FERROUS SULFATE TAB EC 325 MG (65 MG FE EQUIVALENT) 325 MG: 325 (65 FE) TABLET DELAYED RESPONSE at 08:05

## 2019-05-02 RX ADMIN — FERROUS SULFATE TAB EC 325 MG (65 MG FE EQUIVALENT) 325 MG: 325 (65 FE) TABLET DELAYED RESPONSE at 04:05

## 2019-05-02 RX ADMIN — PALIPERIDONE 3 MG: 3 TABLET, EXTENDED RELEASE ORAL at 11:05

## 2019-05-02 RX ADMIN — THERA TABS 1 TABLET: TAB at 08:05

## 2019-05-02 RX ADMIN — FOLIC ACID 1 MG: 1 TABLET ORAL at 08:05

## 2019-05-02 NOTE — ASSESSMENT & PLAN NOTE
- Patient with tachycardia (up to 141) over past several days; not elevated on admission   - Reports hx of fast heart rate but cannot explain further   - still with some psychomotor agitation   - Repeat CBC with slight rise in H/H  - Have consulted internal medicine  recommend outpatient PCP follow up, increasing iron supplementation, monitoring of HR and starting metoprolol tartrate 12.5 mg PO daily if pulse continues to be elevated   - Will continue to monitor and consider metoprolol

## 2019-05-02 NOTE — NURSING
Patient is still responding to internal stimuli.  No significant change in mood or behavior.  Patient is medication compliant with no side effects reported.  She can not participate in groups due to her psychosis.  Disorganized in conversation.  Cont to focus on discharge and wanting to take care of her son.
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
## 2019-05-02 NOTE — PLAN OF CARE
05/02/19 1600   [facility] Group Therapy   Group Name Medication   Participation Level Appropriate;Attentive;Sharing   Participation Quality Cooperative   Insight/Motivation Limited   Affect/Mood Display Appropriate   Cognition Alert

## 2019-05-02 NOTE — PLAN OF CARE
Problem: Adult Behavioral Health Plan of Care  Goal: Plan of Care Review  Outcome: Ongoing (interventions implemented as appropriate)  Patient sitting up in day room. Patient alert and oriented. Patient calm and cooperative and denies any discomfort or distress. Patient safety maintained. Will continue to monitor.

## 2019-05-02 NOTE — PROGRESS NOTES
05/02/19 0900 05/02/19 1100 05/02/19 1200   [facility] Group Therapy   Group Name Community Reintegration Mental Awareness Therapeutic Recreation   Specific Interventions Current Events Cognitive Stimulation Training Skilled Activity Crafts   Participation Level Active  --  Minimal;Active   Participation Quality Cooperative Refused Cooperative   Insight/Motivation Limited  --  Limited   Affect/Mood Display Blunted;Bizarre Restless Blunted   Cognition Alert  --  Alert

## 2019-05-02 NOTE — SUBJECTIVE & OBJECTIVE
"Interval History: see hospital course     Family History     Problem Relation (Age of Onset)    Breast cancer Maternal Aunt    Cancer Maternal Uncle        Tobacco Use    Smoking status: Never Smoker   Substance and Sexual Activity    Alcohol use: No    Drug use: Not on file    Sexual activity: Never     Birth control/protection: Surgical     Psychotherapeutics (From admission, onward)    Start     Stop Route Frequency Ordered    04/18/19 0421  OLANZapine tablet 10 mg  (Olanzapine)      -- Oral 3 times daily PRN 04/18/19 0421 04/18/19 0421  OLANZapine injection 10 mg  (Olanzapine)      -- IM 3 times daily PRN 04/18/19 0421           Review of Systems   Cardiovascular: Negative for chest pain.   Gastrointestinal: Negative for abdominal pain.     Objective:     Vital Signs (Most Recent):  Temp: 98.1 °F (36.7 °C) (05/02/19 0724)  Pulse: 94 (05/02/19 0724)  Resp: 18 (05/02/19 0724)  BP: (!) 102/53 (05/02/19 0724)  SpO2: 100 % (04/30/19 1917) Vital Signs (24h Range):  Temp:  [98.1 °F (36.7 °C)-98.2 °F (36.8 °C)] 98.1 °F (36.7 °C)  Pulse:  [] 94  Resp:  [17-18] 18  BP: (102-135)/(53-57) 102/53     Height: 5' 6" (167.6 cm)  Weight: 63 kg (138 lb 14.2 oz)  Body mass index is 22.42 kg/m².    No intake or output data in the 24 hours ending 05/02/19 0800    Physical Exam       Mental Status Exam:  Appearance: age appropriate, disheveled  Behavior/Cooperation: cooperative  Speech: rapid, mumbled  Mood: "pretty good"  Affect: blunted  Thought Process: tangential  Thought Content: Denies SI/HI/AVH. + paranoid delusions regarding family memebers   Orientation: grossly intact  Memory: Grossly intact  Attention Span/Concentration: Impaired to some degree  Insight: poor, minimal insight into illness   Judgment: limited         Significant Labs:   Last 24 Hours:   Recent Lab Results       05/01/19  1018        Baso # 0.02     Basophil% 0.4     Differential Method Automated     Eos # 0.0     Eosinophil% 0.4     Gran # " (ANC) 3.1     Gran% 57.8     Hematocrit 33.6     Hemoglobin 10.3     Immature Grans (Abs) 0.01  Comment:  Mild elevation in immature granulocytes is non specific and   can be seen in a variety of conditions including stress response,   acute inflammation, trauma and pregnancy. Correlation with other   laboratory and clinical findings is essential.       Immature Granulocytes 0.2     Lymph # 1.9     Lymph% 35.5     MCH 25.9     MCHC 30.7     MCV 85     Mono # 0.3     Mono% 5.7     MPV 8.9     nRBC 0     Platelets 306     RBC 3.97     RDW 16.0     WBC 5.30           Significant Imaging: None

## 2019-05-02 NOTE — ASSESSMENT & PLAN NOTE
- Shun 28, Fe 48, Fe sat 12, TIBC 389  - ferrous sulfate increased to 325 mg PO TID per internal medicine recommendations (5/1)

## 2019-05-02 NOTE — PLAN OF CARE
Problem: Adult Behavioral Health Plan of Care  Goal: Plan of Care Review  Outcome: Ongoing (interventions implemented as appropriate)  Patient cont to repond to internal stimuli.  Her thought process are disorganized and have delusional content.  Still reports that she want to be discharged and take care of her son.  No insight into illness or aftercare need.  Takes medication and  Does not appear to be having any side effects.    Goal: Adheres to Safety Considerations for Self and Others  Outcome: Ongoing (interventions implemented as appropriate)  Staff monitoring patient safety on the unit.   Goal: Optimized Coping Skills in Response to Life Stressors  Outcome: Ongoing (interventions implemented as appropriate)  Unable to ID and use coping skills.  Goal: Develops/Participates in Therapeutic [place] to Support Successful Transition  Outcome: Ongoing (interventions implemented as appropriate)  Unable to participate in the therapeutic alliance due to psychosis    Problem: Cognitive Impairment (Psychotic Signs/Symptoms)  Goal: Improved Thought Clarity and Organization (Psychotic Signs/Symptoms)  Outcome: Ongoing (interventions implemented as appropriate)  Patient thought processes are disorganzied and delusional.      Problem: Sleep Impairment (Psychotic Signs/Symptoms)  Goal: Improved Sleep Hygiene (Psychotic Signs/Symptoms)  Outcome: Ongoing (interventions implemented as appropriate)  Patient cont to wake up in the middle of the night and starts talking loudly to herself.

## 2019-05-02 NOTE — PROGRESS NOTES
"Sw made call to Dylon [phone],    Dylon stated at baseline, patient is calm and talkative. Patient  communicates more effectively and she takes care of herself. Patient wears wigs due to hair loss,  but according to Dylon, patient's "wigs do not last," due to the fact patient sleeps in her wigs and she does not take proper care of her wigs. Dylon stated when patient's wings become damaged or tangled, patient disposes of the  old wig, and replaces it with a new wig.     Dylon stated patient does help out a lot  around the house, and patient does not take care of her son alone; patient's mother cared for patient and her son. Patient grew accustomed to being cared for, according to Dylon and she was never given the responsibility of totally caring for herself, or  her son on her own.     Dylon stated when patient is medicine compliant, she is more sociable  with the family  Members, and others outside of the family.  Dylon stated he believes if patient is medicine compliant, she will function more effectively.    "

## 2019-05-02 NOTE — NURSING
Called patient's brother to schedule family meeting for Monday, May 6 ([phone]).  Left message with my contact information. Awaiting call back.

## 2019-05-02 NOTE — NURSING
Called [facility] home [facility] and let them know that patient would probably be discharged Monday/early next week depending on family meeting.
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
## 2019-05-02 NOTE — PROGRESS NOTES
Brant made contact with patient's brother Dylon  [phone].     Dylon stated he will informed his employer of the family meeting scheduled for Monday May  6, 2019 @ 10:00am. Dylon stated  he does not foresee any problems attending, however, he still must  clear it with his employer.

## 2019-05-02 NOTE — PT/OT/SLP PROGRESS
"Occupational Therapy   Mental Health Group    Name: Faye Khalil  MRN: [id number]  Admitting Diagnosis:  Schizophrenia, chronic condition with acute exacerbation       Assessment:   Faye Khalil is a 33 y.o. female with a medical diagnosis of Schizophrenia, chronic condition with acute exacerbation.  She presents with engagment in sensory motor group on this date. She is noted to be talking to self throughout group; however, noted verbalizations appropriate to group topic and activity. She will cont to benefit from OT groups and education while in acute setting.      Rehab Prognosis: Good; Pt is appropriate for continued OT services to address: group participation, emotional regulation, safety, , self care  and to receive education related to healthy participation in daily roles and rotuines.  Plan:   Patient to be seen for OT groups and treatment sessions as needed to address the above listed problems via self-care/home management, therapeutic activities, therapeutic groups, community/work re-entry  · Plan of Care Expires: 05/23/19  · Plan of Care Reviewed with: patient    Subjective   Comments Made/Response to Group: "If I didn't have on a dress"  Pain/Comfort:  · Pain Rating 1: 0/10  · Pain Rating Post-Intervention 1: 0/10    Objective:   Precautions: PEC and Suicide    Group Activity:seated yoga  Purpose: sensory motor group     Participation: present 90 % of group and participated in all aspects  Appearance/Expression: fair grooming, casual clothing and engaged  Activity Level: distracting behaviors and sensory seeking  Socialization: talking to self throughout  Commands: followed appropriately  Mood/Affect: cooperative    GOALS:   Multidisciplinary Problems     Occupational Therapy Goals        Problem: Occupational Therapy Goal    Goal Priority Disciplines Outcome Interventions   Occupational Therapy Goal     OT, PT/OT     Description:  Goals: 4 sessions     1. Pt will attend group without " "encouragement.   2. Pt will remain in group 100% of session.   3. Pt will be able to attend to task 75% with minimal verbal cues.   4. Pt will be able to initiate participation in task with minimal verbal cues.   5. Pt will interact with two group members in immediate environment during session.   6. Pt will verbalize/demonstrate understanding of group purpose with minimal verbal cues at end of session.    7. Pt will verbalize/demo understanding and identify use of 1-2 coping skills to use when upset.      Patient's Personal Goals:  1. " I want to get a job at [employer]"                         Time Tracking:     OT Date of Treatment: 05/02/19  OT Start Time: 1000  OT Stop Time: 1030  OT Total Time (min): 30 min    Billable Minutes:Therapeutic Group 30    [facility]  5/2/2019      "

## 2019-05-03 PROCEDURE — 99232 SBSQ HOSP IP/OBS MODERATE 35: CPT | Mod: ,,, | Performed by: PSYCHIATRY & NEUROLOGY

## 2019-05-03 PROCEDURE — 25000003 PHARM REV CODE 250: Performed by: PSYCHIATRY & NEUROLOGY

## 2019-05-03 PROCEDURE — 90853 PR GROUP PSYCHOTHERAPY: ICD-10-PCS | Mod: ,,, | Performed by: PSYCHOLOGIST

## 2019-05-03 PROCEDURE — 25000003 PHARM REV CODE 250: Performed by: STUDENT IN AN ORGANIZED HEALTH CARE EDUCATION/TRAINING PROGRAM

## 2019-05-03 PROCEDURE — 12400001 HC PSYCH SEMI-PRIVATE ROOM

## 2019-05-03 PROCEDURE — 90853 GROUP PSYCHOTHERAPY: CPT | Mod: ,,, | Performed by: PSYCHOLOGIST

## 2019-05-03 PROCEDURE — 99232 PR SUBSEQUENT HOSPITAL CARE,LEVL II: ICD-10-PCS | Mod: ,,, | Performed by: PSYCHIATRY & NEUROLOGY

## 2019-05-03 RX ADMIN — THERA TABS 1 TABLET: TAB at 08:05

## 2019-05-03 RX ADMIN — FOLIC ACID 1 MG: 1 TABLET ORAL at 08:05

## 2019-05-03 RX ADMIN — FERROUS SULFATE TAB EC 325 MG (65 MG FE EQUIVALENT) 325 MG: 325 (65 FE) TABLET DELAYED RESPONSE at 04:05

## 2019-05-03 RX ADMIN — PALIPERIDONE 3 MG: 3 TABLET, EXTENDED RELEASE ORAL at 08:05

## 2019-05-03 RX ADMIN — FERROUS SULFATE TAB EC 325 MG (65 MG FE EQUIVALENT) 325 MG: 325 (65 FE) TABLET DELAYED RESPONSE at 08:05

## 2019-05-03 NOTE — PROGRESS NOTES
Group Psychotherapy (PhD/LCSW)    Site: [facility]    Clinical status of patient: Inpatient    Date: 5/2/2019    Group Focus: Life Skills    Length of service: 11796 - 35-40 minutes    Number of patients in attendance: 10    Referred by: Acute Psychiatry Unit Treatment Team    Target symptoms: Psychosis    Patient's response to treatment: Active Listening    Progress toward goals: Progressing slowly    Interval History: Pt appeared alert and attentive in group. Pt participated briefly, appropriately when prompted in a group discussion of the danger of expectations when they are excessively high or excessively low. Noted the way excessively high expectations can lead to over-confidence and/or disappointment and the way excessively low expectations can promote self-fulfilling prophecies.     Diagnosis:  Paranoid Schizophrenia    Plan: Continue treatment on [facility]

## 2019-05-03 NOTE — SUBJECTIVE & OBJECTIVE
"Interval History: ***    Family History     Problem Relation (Age of Onset)    Breast cancer Maternal Aunt    Cancer Maternal Uncle        Tobacco Use    Smoking status: Never Smoker   Substance and Sexual Activity    Alcohol use: No    Drug use: Not on file    Sexual activity: Never     Birth control/protection: Surgical     Psychotherapeutics (From admission, onward)    Start     Stop Route Frequency Ordered    05/02/19 1000  paliperidone 24 hr tablet 3 mg      -- Oral Daily 05/02/19 0900    04/18/19 0421  OLANZapine tablet 10 mg  (Olanzapine)      -- Oral 3 times daily PRN 04/18/19 0421 04/18/19 0421  OLANZapine injection 10 mg  (Olanzapine)      -- IM 3 times daily PRN 04/18/19 0421           Review of Systems  Objective:     Vital Signs (Most Recent):  Temp: 98.2 °F (36.8 °C) (05/03/19 0725)  Pulse: 83 (05/03/19 0725)  Resp: 18 (05/03/19 0725)  BP: (!) 113/59 (05/03/19 0725)  SpO2: 100 % (04/30/19 1917) Vital Signs (24h Range):  Temp:  [98.2 °F (36.8 °C)-98.4 °F (36.9 °C)] 98.2 °F (36.8 °C)  Pulse:  [83-86] 83  Resp:  [16-18] 18  BP: (103-113)/(59-62) 113/59     Height: 5' 6" (167.6 cm)  Weight: 63 kg (138 lb 14.2 oz)  Body mass index is 22.42 kg/m².    No intake or output data in the 24 hours ending 05/03/19 0812    Physical Exam     Significant Labs: {Results:77607}    Significant Imaging: {Imaging Review:13505}  "

## 2019-05-03 NOTE — ASSESSMENT & PLAN NOTE
- Improving   - Reports hx of fast heart rate but cannot explain further   - still with some psychomotor agitation   - Repeat CBC with slight rise in H/H  - Have consulted internal medicine  recommend outpatient PCP follow up, increasing iron supplementation, monitoring of HR and starting metoprolol tartrate 12.5 mg PO daily if pulse continues to be elevated   - Will continue to monitor and consider metoprolol

## 2019-05-03 NOTE — PLAN OF CARE
Problem: Adult Behavioral Health Plan of Care  Goal: Plan of Care Review  Outcome: Ongoing (interventions implemented as appropriate)  Patient alert and oriented. Patient calm and cooperative and denies any complaints of pain or discomfort at this time. Patient safety maintained. Will continue to monitor.

## 2019-05-03 NOTE — SUBJECTIVE & OBJECTIVE
"Interval History: see hospital course     Family History     Problem Relation (Age of Onset)    Breast cancer Maternal Aunt    Cancer Maternal Uncle        Tobacco Use    Smoking status: Never Smoker   Substance and Sexual Activity    Alcohol use: No    Drug use: Not on file    Sexual activity: Never     Birth control/protection: Surgical     Psychotherapeutics (From admission, onward)    Start     Stop Route Frequency Ordered    05/02/19 1000  paliperidone 24 hr tablet 3 mg      -- Oral Daily 05/02/19 0900    04/18/19 0421  OLANZapine tablet 10 mg  (Olanzapine)      -- Oral 3 times daily PRN 04/18/19 0421    04/18/19 0421  OLANZapine injection 10 mg  (Olanzapine)      -- IM 3 times daily PRN 04/18/19 0421           Review of Systems   Cardiovascular: Negative for chest pain.   Gastrointestinal: Negative for abdominal pain.     Objective:     Vital Signs (Most Recent):  Temp: 98.2 °F (36.8 °C) (05/03/19 0725)  Pulse: 83 (05/03/19 0725)  Resp: 18 (05/03/19 0725)  BP: (!) 113/59 (05/03/19 0725)  SpO2: 100 % (04/30/19 1917) Vital Signs (24h Range):  Temp:  [98.2 °F (36.8 °C)-98.4 °F (36.9 °C)] 98.2 °F (36.8 °C)  Pulse:  [83-86] 83  Resp:  [16-18] 18  BP: (103-113)/(59-62) 113/59     Height: 5' 6" (167.6 cm)  Weight: 63 kg (138 lb 14.2 oz)  Body mass index is 22.42 kg/m².    No intake or output data in the 24 hours ending 05/03/19 1203    Physical Exam       Mental Status Exam:  Appearance: age appropriate, wearing wig with bald patch  Behavior/Cooperation: cooperative  Speech: rapid, mumbled  Mood: "ok"  Affect: blunted  Thought Process: linear with short replies to questions  Thought Content: Denies SI/HI/AVH; volunteers no delusions today   Orientation: grossly intact  Memory: Grossly intact  Attention Span/Concentration: Impaired to some degree  Insight: poor, minimal insight into illness   Judgment: limited         Significant Labs:   Last 24 Hours:   Recent Lab Results     None          Significant Imaging: " None
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
## 2019-05-03 NOTE — PLAN OF CARE
Problem: Adult Behavioral Health Plan of Care  Goal: Plan of Care Review  Outcome: Ongoing (interventions implemented as appropriate)  Patient mood is labile.  No agitation noted.  She reports feeling ready to go home.  She is focused on seeing her son.  She had poor insight into illness and aftercare needs.  She is medication compliant.  Cont to frequently respond to internal stimuli.  Denies audio/visual hallucinations when asked.    Goal: Adheres to Safety Considerations for Self and Others  Outcome: Ongoing (interventions implemented as appropriate)  Staff monitors patients safety on the unit.   Goal: Optimized Coping Skills in Response to Life Stressors  Outcome: Ongoing (interventions implemented as appropriate)  Unable to ID or develop appropriate coping skills.   Goal: Develops/Participates in Therapeutic [place] to Support Successful Transition  Outcome: Ongoing (interventions implemented as appropriate)  Unable to participate in the therapeutic alliance.    Problem: Cognitive Impairment (Psychotic Signs/Symptoms)  Goal: Improved Thought Clarity and Organization (Psychotic Signs/Symptoms)  Outcome: Ongoing (interventions implemented as appropriate)  Thought processes remains disorganized and delusional.      Problem: Sleep Impairment (Psychotic Signs/Symptoms)  Goal: Improved Sleep Hygiene (Psychotic Signs/Symptoms)  Outcome: Ongoing (interventions implemented as appropriate)  Patient is sleeping better but still gets up in the night.

## 2019-05-03 NOTE — PROGRESS NOTES
05/03/19 0900 05/03/19 1000 05/03/19 1100   [facility] Group Therapy   Group Name Community Reintegration Education Education   Specific Interventions Current Events Relapse Prevention Guided Imagery/Relaxation   Participation Level Active;Appropriate Active;Appropriate;Sharing Active;Appropriate   Participation Quality Cooperative Cooperative Cooperative   Insight/Motivation Limited Good Limited;Good   Affect/Mood Display Appropriate Appropriate Appropriate   Cognition Alert Alert Alert      05/03/19 1300   [facility] Therapy   Group Name Therapeutic Recreation   Specific Interventions   (movie social)   Participation Level Active;Appropriate   Participation Quality Cooperative   Insight/Motivation Good   Affect/Mood Display Appropriate   Cognition Alert

## 2019-05-03 NOTE — PROGRESS NOTES
05/02/19 2000   [facility] Group Therapy   Group Name Community Reintegration   Specific Interventions Remotivation   Participation Level Appropriate   Participation Quality Cooperative   Insight/Motivation Limited   Affect/Mood Display Appropriate   Cognition Alert
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
## 2019-05-04 PROCEDURE — 25000003 PHARM REV CODE 250: Performed by: PSYCHIATRY & NEUROLOGY

## 2019-05-04 PROCEDURE — 25000003 PHARM REV CODE 250: Performed by: STUDENT IN AN ORGANIZED HEALTH CARE EDUCATION/TRAINING PROGRAM

## 2019-05-04 PROCEDURE — 12400001 HC PSYCH SEMI-PRIVATE ROOM

## 2019-05-04 RX ADMIN — FERROUS SULFATE TAB EC 325 MG (65 MG FE EQUIVALENT) 325 MG: 325 (65 FE) TABLET DELAYED RESPONSE at 08:05

## 2019-05-04 RX ADMIN — PALIPERIDONE 3 MG: 3 TABLET, EXTENDED RELEASE ORAL at 08:05

## 2019-05-04 RX ADMIN — FOLIC ACID 1 MG: 1 TABLET ORAL at 08:05

## 2019-05-04 RX ADMIN — FERROUS SULFATE TAB EC 325 MG (65 MG FE EQUIVALENT) 325 MG: 325 (65 FE) TABLET DELAYED RESPONSE at 06:05

## 2019-05-04 RX ADMIN — THERA TABS 1 TABLET: TAB at 08:05
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
## 2019-05-04 NOTE — PROGRESS NOTES
Group Psychotherapy (PhD/LCSW)    Site: [facility]    Clinical status of patient: Inpatient    Date: 5/3/2019    Group Focus: Life Skills    Length of service: 48674 - 35-40 minutes    Number of patients in attendance: 8    Referred by: Acute Psychiatry Unit Treatment Team    Target symptoms: Psychosis    Patient's response to treatment: Active Listening    Progress toward goals: Progressing slowly    Interval History: Pt appeared alert and attentive in group. Pt participated briefly, appropriately when prompted in a group discussion of changing dysfunctional patterns of relationship dynamics by focusing on changing one's own behavior rather than trying to convince the other person to change theirs.     Diagnosis:  Paranoid Schizophrenia    Plan: Continue treatment on AP[facility]

## 2019-05-04 NOTE — PLAN OF CARE
Problem: Adult Behavioral Health Plan of Care  Goal: Plan of Care Review  Outcome: Ongoing (interventions implemented as appropriate)  POC discussed with pt, calm and cooperative on the unit. Follows direction and attends group with limited participation. Med compliant, fair hygiene,and good appetite. Denies SI/HI/AVH, anxious affect, thoughts are disorganized. Mood is fair. Out visible on the unit. Safety plan reviewed and environmental rounds done. Reviewed medicine with pt will require further instruction. Pt given time to ask questions, all questions answered. MVC in place will continue to monitor.     Problem: Cognitive Impairment (Psychotic Signs/Symptoms)  Goal: Improved Thought Clarity and Organization (Psychotic Signs/Symptoms)  Outcome: Ongoing (interventions implemented as appropriate)  Pt thoughts are focused on discharge. She is disorganized with pressured and rambling speech.     Problem: Sleep Impairment (Psychotic Signs/Symptoms)  Goal: Improved Sleep Hygiene (Psychotic Signs/Symptoms)  Outcome: Ongoing (interventions implemented as appropriate)  Pt appears to be sleeping well, NAD noted.

## 2019-05-04 NOTE — SUBJECTIVE & OBJECTIVE
"Interval History: see hospital course     Family History     Problem Relation (Age of Onset)    Breast cancer Maternal Aunt    Cancer Maternal Uncle        Tobacco Use    Smoking status: Never Smoker   Substance and Sexual Activity    Alcohol use: No    Drug use: Not on file    Sexual activity: Never     Birth control/protection: Surgical     Psychotherapeutics (From admission, onward)    Start     Stop Route Frequency Ordered    05/02/19 1000  paliperidone 24 hr tablet 3 mg      -- Oral Daily 05/02/19 0900    04/18/19 0421  OLANZapine tablet 10 mg  (Olanzapine)      -- Oral 3 times daily PRN 04/18/19 0421    04/18/19 0421  OLANZapine injection 10 mg  (Olanzapine)      -- IM 3 times daily PRN 04/18/19 0421           Review of Systems   Cardiovascular: Negative for chest pain.   Gastrointestinal: Negative for abdominal pain.     Objective:     Vital Signs (Most Recent):  Temp: 98.2 °F (36.8 °C) (05/04/19 0744)  Pulse: 110 (05/04/19 0744)  Resp: 17 (05/04/19 0744)  BP: (!) 120/58 (05/04/19 0744)  SpO2: 100 % (04/30/19 1917) Vital Signs (24h Range):  Temp:  [98.2 °F (36.8 °C)-98.3 °F (36.8 °C)] 98.2 °F (36.8 °C)  Pulse:  [] 110  Resp:  [17] 17  BP: (107-120)/(52-58) 120/58     Height: 5' 6" (167.6 cm)  Weight: 63 kg (138 lb 14.2 oz)  Body mass index is 22.42 kg/m².    No intake or output data in the 24 hours ending 05/04/19 1022    Physical Exam       Mental Status Exam:  Appearance: age appropriate, wearing wig with bald patch  Behavior/Cooperation: cooperative  Speech: rapid, mumbled  Mood: "fine"  Affect: blunted  Thought Process: linear with short replies to questions  Thought Content: Denies SI/HI/AVH; volunteers no delusions today   Orientation: grossly intact  Memory: Grossly intact  Attention Span/Concentration: Impaired to some degree  Insight: poor, minimal insight into illness   Judgment: limited         Significant Labs:   Last 24 Hours:   Recent Lab Results     None          Significant Imaging: " None

## 2019-05-04 NOTE — PROGRESS NOTES
05/04/19 1611   [facility] Group Therapy   Group Name Medication   Specific Interventions Diagnostic-Related   Participation Level Active;Supportive   Participation Quality Cooperative   Insight/Motivation Improved   Affect/Mood Display Appropriate   Cognition Alert

## 2019-05-04 NOTE — PLAN OF CARE
Problem: Adult Behavioral Health Plan of Care  Goal: Plan of Care Review  Outcome: Ongoing (interventions implemented as appropriate)  Patient alert and oriented. Patient calm and cooperative and denies any complaints of pain or discomfort. Safety maintained. Will continue to monitor.

## 2019-05-04 NOTE — ASSESSMENT & PLAN NOTE
- Shun 28, Fe 48, Fe sat 12, TIBC 389  - ferrous sulfate 325 mg PO TID per internal medicine recommendations (5/1)

## 2019-05-04 NOTE — NURSING
Appears to have slept approximately 6-7 hours thus far this shift.  Safety maintained throughout shift.  See Observation Checklist.  Will continue to monitor.

## 2019-05-05 PROCEDURE — 25000003 PHARM REV CODE 250: Performed by: STUDENT IN AN ORGANIZED HEALTH CARE EDUCATION/TRAINING PROGRAM

## 2019-05-05 PROCEDURE — 12400001 HC PSYCH SEMI-PRIVATE ROOM

## 2019-05-05 PROCEDURE — 99231 PR SUBSEQUENT HOSPITAL CARE,LEVL I: ICD-10-PCS | Mod: GC,,, | Performed by: PSYCHIATRY & NEUROLOGY

## 2019-05-05 PROCEDURE — 25000003 PHARM REV CODE 250: Performed by: PSYCHIATRY & NEUROLOGY

## 2019-05-05 PROCEDURE — 99231 SBSQ HOSP IP/OBS SF/LOW 25: CPT | Mod: GC,,, | Performed by: PSYCHIATRY & NEUROLOGY

## 2019-05-05 RX ADMIN — FERROUS SULFATE TAB EC 325 MG (65 MG FE EQUIVALENT) 325 MG: 325 (65 FE) TABLET DELAYED RESPONSE at 08:05

## 2019-05-05 RX ADMIN — FOLIC ACID 1 MG: 1 TABLET ORAL at 08:05

## 2019-05-05 RX ADMIN — THERA TABS 1 TABLET: TAB at 08:05

## 2019-05-05 RX ADMIN — PALIPERIDONE 3 MG: 3 TABLET, EXTENDED RELEASE ORAL at 08:05

## 2019-05-05 RX ADMIN — FERROUS SULFATE TAB EC 325 MG (65 MG FE EQUIVALENT) 325 MG: 325 (65 FE) TABLET DELAYED RESPONSE at 03:05

## 2019-05-05 NOTE — PLAN OF CARE
Problem: Adult Behavioral Health Plan of Care  Goal: Plan of Care Review  Outcome: Ongoing (interventions implemented as appropriate)  POC discussed with pt, calm and cooperative on the unit. Follows direction and attends group with limited participation. Med compliant, good hygiene,and fair appetite. Denies SI/HI/AVH, anxious affect, thoughts are focused on discharge. Mood is fair. Out visible on the unit. Safety plan reviewed and environmental rounds done. Reviewed medicine with pt will require further instruction. Pt given time to ask questions, all questions answered. MVC in place will continue to monitor.     Problem: Cognitive Impairment (Psychotic Signs/Symptoms)  Goal: Improved Thought Clarity and Organization (Psychotic Signs/Symptoms)  Outcome: Ongoing (interventions implemented as appropriate)  Pt has poor insight into her diagnosis. She is med compliant but has rambling and pressured speech. Pt has flight of ideas and appears to be RIS at times.     Problem: Sleep Impairment (Psychotic Signs/Symptoms)  Goal: Improved Sleep Hygiene (Psychotic Signs/Symptoms)  Outcome: Ongoing (interventions implemented as appropriate)  Pt appears to be sleeping without signs of distress.
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
## 2019-05-05 NOTE — ASSESSMENT & PLAN NOTE
- Improving   - Reports hx of fast heart rate but cannot explain further   - still with some psychomotor agitation   - Repeat CBC with slight rise in H/H  - Have consulted internal medicine  recommend outpatient PCP follow up, increasing iron supplementation, monitoring of HR and starting metoprolol tartrate 12.5 mg PO daily if pulse continues to be elevated   - Will continue to monitor and consider metoprolol; HR above 100 x 1 over the past 24 hours will continue to monitor

## 2019-05-05 NOTE — SUBJECTIVE & OBJECTIVE
"Interval History: see hospital course     Family History     Problem Relation (Age of Onset)    Breast cancer Maternal Aunt    Cancer Maternal Uncle        Tobacco Use    Smoking status: Never Smoker   Substance and Sexual Activity    Alcohol use: No    Drug use: Not on file    Sexual activity: Never     Birth control/protection: Surgical     Psychotherapeutics (From admission, onward)    Start     Stop Route Frequency Ordered    05/02/19 1000  paliperidone 24 hr tablet 3 mg      -- Oral Daily 05/02/19 0900    04/18/19 0421  OLANZapine tablet 10 mg  (Olanzapine)      -- Oral 3 times daily PRN 04/18/19 0421    04/18/19 0421  OLANZapine injection 10 mg  (Olanzapine)      -- IM 3 times daily PRN 04/18/19 0421           Review of Systems   Cardiovascular: Negative for chest pain.   Gastrointestinal: Negative for abdominal pain.     Objective:     Vital Signs (Most Recent):  Temp: 97.7 °F (36.5 °C) (05/05/19 0747)  Pulse: 95 (05/05/19 0747)  Resp: 16 (05/05/19 0747)  BP: (!) 118/56 (05/05/19 0747)  SpO2: 100 % (04/30/19 1917) Vital Signs (24h Range):  Temp:  [97.7 °F (36.5 °C)-98.1 °F (36.7 °C)] 97.7 °F (36.5 °C)  Pulse:  [81-95] 95  Resp:  [16] 16  BP: (102-118)/(56-59) 118/56     Height: 5' 6" (167.6 cm)  Weight: 63 kg (138 lb 14.2 oz)  Body mass index is 22.42 kg/m².    No intake or output data in the 24 hours ending 05/05/19 1448    Physical Exam       Mental Status Exam:  Appearance: age appropriate, wearing wig with bald patch  Behavior/Cooperation: cooperative, anxious   Speech: rapid, mumbled  Mood: "fine"  Affect: blunted  Thought Process: linear with short replies to questions  Thought Content: Denies SI/HI/AVH; however seen RIS    Orientation: grossly intact  Memory: Grossly intact  Attention Span/Concentration: Impaired to some degree  Insight: poor, minimal insight into illness   Judgment: limited         Significant Labs:   Last 24 Hours:   Recent Lab Results     None          Significant Imaging: " None

## 2019-05-05 NOTE — PLAN OF CARE
Problem: Adult Behavioral Health Plan of Care  Goal: Plan of Care Review  Outcome: Ongoing (interventions implemented as appropriate)  Patient dressed casually with good grooming. Affect blunted. Denies SI/HI/AVH. Observed talking out loud to self while using the bathroom. Med compliant. Attended unit activities. Denies any physical complaints. Focus on discharge. Will continue to monitor patient

## 2019-05-06 VITALS
SYSTOLIC BLOOD PRESSURE: 120 MMHG | TEMPERATURE: 98 F | DIASTOLIC BLOOD PRESSURE: 55 MMHG | HEIGHT: 66 IN | RESPIRATION RATE: 16 BRPM | HEART RATE: 99 BPM | OXYGEN SATURATION: 100 % | WEIGHT: 138.88 LBS | BODY MASS INDEX: 22.32 KG/M2

## 2019-05-06 PROBLEM — F20.9 SCHIZOPHRENIA, CHRONIC CONDITION WITH ACUTE EXACERBATION: Status: RESOLVED | Noted: 2019-04-18 | Resolved: 2019-05-06

## 2019-05-06 PROCEDURE — 90853 GROUP PSYCHOTHERAPY: CPT | Mod: ,,, | Performed by: PSYCHOLOGIST

## 2019-05-06 PROCEDURE — 25000003 PHARM REV CODE 250: Performed by: STUDENT IN AN ORGANIZED HEALTH CARE EDUCATION/TRAINING PROGRAM

## 2019-05-06 PROCEDURE — 90853 PR GROUP PSYCHOTHERAPY: ICD-10-PCS | Mod: ,,, | Performed by: PSYCHOLOGIST

## 2019-05-06 PROCEDURE — 99239 PR HOSPITAL DISCHARGE DAY,>30 MIN: ICD-10-PCS | Mod: ,,, | Performed by: PSYCHIATRY & NEUROLOGY

## 2019-05-06 PROCEDURE — 99239 HOSP IP/OBS DSCHRG MGMT >30: CPT | Mod: ,,, | Performed by: PSYCHIATRY & NEUROLOGY

## 2019-05-06 PROCEDURE — 25000003 PHARM REV CODE 250: Performed by: PSYCHIATRY & NEUROLOGY

## 2019-05-06 RX ORDER — FERROUS SULFATE 325(65) MG
325 TABLET, DELAYED RELEASE (ENTERIC COATED) ORAL 3 TIMES DAILY
Qty: 90 TABLET | Refills: 0 | Status: SHIPPED | OUTPATIENT
Start: 2019-05-06 | End: 2019-06-05

## 2019-05-06 RX ORDER — DOCUSATE SODIUM 100 MG/1
100 CAPSULE, LIQUID FILLED ORAL DAILY PRN
Qty: 30 CAPSULE | Refills: 0 | Status: SHIPPED | OUTPATIENT
Start: 2019-05-06 | End: 2019-06-05

## 2019-05-06 RX ADMIN — FOLIC ACID 1 MG: 1 TABLET ORAL at 08:05

## 2019-05-06 RX ADMIN — THERA TABS 1 TABLET: TAB at 08:05

## 2019-05-06 RX ADMIN — FERROUS SULFATE TAB EC 325 MG (65 MG FE EQUIVALENT) 325 MG: 325 (65 FE) TABLET DELAYED RESPONSE at 02:05

## 2019-05-06 RX ADMIN — PALIPERIDONE 3 MG: 3 TABLET, EXTENDED RELEASE ORAL at 08:05

## 2019-05-06 RX ADMIN — FERROUS SULFATE TAB EC 325 MG (65 MG FE EQUIVALENT) 325 MG: 325 (65 FE) TABLET DELAYED RESPONSE at 08:05

## 2019-05-06 NOTE — PROGRESS NOTES
Group Psychotherapy (PhD/LCSW)    Site: [facility]    Clinical status of patient: Inpatient    Date: 5/6/2019    Group Focus: Life Skills    Length of service: 22098 - 35-40 minutes    Number of patients in attendance: 10    Referred by: Acute Psychiatry Unit Treatment Team    Target symptoms: Psychosis    Patient's response to treatment: Active Listening; Self Disclosure     Progress toward goals: Progressing slowly    Interval History: Pt appeared alert and attentive in group. Pt participated briefly, appropriately when prompted in a group discussion of plans for maintaining progress post discharge from the [facility].  Pt said she plans to make use of the experience on the [facility] where she appreciated listening to others and learning from their experience.     Diagnosis:  Paranoid Schizophrenia    Plan: Continue treatment on [facility]

## 2019-05-06 NOTE — NURSING
Patient dressed casually with fair grooming. Denies SI/HI/AVH. Mood good. Calm and cooperative. Attended unit activities with minimal interactions. Med compliant. Meet with treatment team. MD discussed medications, follow up and discharge home instructions. Verbalized understanding.

## 2019-05-06 NOTE — SUBJECTIVE & OBJECTIVE
"Interval History: see hospital course     Family History     Problem Relation (Age of Onset)    Breast cancer Maternal Aunt    Cancer Maternal Uncle        Tobacco Use    Smoking status: Never Smoker   Substance and Sexual Activity    Alcohol use: No    Drug use: Not on file    Sexual activity: Never     Birth control/protection: Surgical     Psychotherapeutics (From admission, onward)    Start     Stop Route Frequency Ordered    05/02/19 1000  paliperidone 24 hr tablet 3 mg      -- Oral Daily 05/02/19 0900    04/18/19 0421  OLANZapine tablet 10 mg  (Olanzapine)      -- Oral 3 times daily PRN 04/18/19 0421    04/18/19 0421  OLANZapine injection 10 mg  (Olanzapine)      -- IM 3 times daily PRN 04/18/19 0421           Review of Systems   Cardiovascular: Negative for chest pain.   Gastrointestinal: Negative for abdominal pain.   Neurological: Negative for headaches.     Objective:     Vital Signs (Most Recent):  Temp: 98.1 °F (36.7 °C) (05/06/19 0757)  Pulse: 99 (05/06/19 0757)  Resp: 16 (05/06/19 0757)  BP: (!) 120/55 (05/06/19 0757)  SpO2: 100 % (05/05/19 1923) Vital Signs (24h Range):  Temp:  [98.1 °F (36.7 °C)-98.2 °F (36.8 °C)] 98.1 °F (36.7 °C)  Pulse:  [96-99] 99  Resp:  [16-18] 16  SpO2:  [100 %] 100 %  BP: (112-120)/(55-56) 120/55     Height: 5' 6" (167.6 cm)  Weight: 63 kg (138 lb 14.2 oz)  Body mass index is 22.42 kg/m².    No intake or output data in the 24 hours ending 05/06/19 0802    Physical Exam       Mental Status Exam:  Appearance: age appropriate, wearing wig with bald patch  Behavior/Cooperation: cooperative, anxious   Speech: rapid, mumbled  Mood: "fine"  Affect: blunted  Thought Process: linear with short replies to questions  Thought Content: Denies SI/HI/AVh, volunteers no delusions   Orientation: grossly intact  Memory: Grossly intact  Attention Span/Concentration: Impaired to some degree  Insight: poor, minimal insight into illness   Judgment: limited         Significant Labs:   Last 24 " Hours:   Recent Lab Results     None          Significant Imaging: None

## 2019-05-06 NOTE — NURSING
Left  for patient's brother, Dylon ([phone]) and notified him that she is being discharged today.

## 2019-05-06 NOTE — PSYCH
Patient given copy of discharge home instructions and prescriptions. Verbalized understanding. Discharge ambulatory accompanied by brother to home.
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
## 2019-05-06 NOTE — PROGRESS NOTES
"                    Medical Nutrition Therapy      Reason for Assessment: LOS  Dx:Schizophrenia, chronic condition with acute exacerbation    Medical Hx:  Past Medical History:   Diagnosis Date    Anemia     Mental disorder     Schizophrenia          Interdisciplinary Rounds: Did not Attend  Discharge planning: Regular diet  w/ PO intake >75%    General Info: Pt with no changes to appetite or weight PTA. No concern for malnutrition at this time.    Current Diet: Regular    Ht:  Ht Readings from Last 1 Encounters:   04/18/19 5' 6" (1.676 m)     Wt:  Wt Readings from Last 1 Encounters:   04/18/19 63 kg (138 lb 14.2 oz)     Body mass index is 22.42 kg/m².      Labs: Reviewed  CMP  Sodium   Date Value Ref Range Status   04/17/2019 138 136 - 145 mmol/L Final     Potassium   Date Value Ref Range Status   04/17/2019 3.5 3.5 - 5.1 mmol/L Final     Glucose   Date Value Ref Range Status   04/17/2019 83 70 - 110 mg/dL Final     BUN, Bld   Date Value Ref Range Status   04/17/2019 10 6 - 20 mg/dL Final     Creatinine   Date Value Ref Range Status   04/17/2019 0.7 0.5 - 1.4 mg/dL Final     Calcium   Date Value Ref Range Status   04/17/2019 9.5 8.7 - 10.5 mg/dL Final     Total Protein   Date Value Ref Range Status   04/17/2019 7.2 6.0 - 8.4 g/dL Final     Albumin   Date Value Ref Range Status   04/17/2019 3.9 3.5 - 5.2 g/dL Final     Total Bilirubin   Date Value Ref Range Status   04/17/2019 0.8 0.1 - 1.0 mg/dL Final     Comment:     For infants and newborns, interpretation of results should be based  on gestational age, weight and in agreement with clinical  observations.  Premature Infant recommended reference ranges:  Up to 24 hours.............<8.0 mg/dL  Up to 48 hours............<12.0 mg/dL  3-5 days..................<15.0 mg/dL  6-29 days.................<15.0 mg/dL       Alkaline Phosphatase   Date Value Ref Range Status   04/17/2019 60 55 - 135 U/L Final     AST   Date Value Ref Range Status   04/17/2019 19 10 - 40 U/L " Final     ALT   Date Value Ref Range Status   04/17/2019 12 10 - 44 U/L Final     eGFR if    Date Value Ref Range Status   04/17/2019 >60.0 >60 mL/min/1.73 m^2 Final     eGFR if non    Date Value Ref Range Status   04/17/2019 >60.0 >60 mL/min/1.73 m^2 Final     Comment:     Calculation used to obtain the estimated glomerular filtration  rate (eGFR) is the CKD-EPI equation.          Meds: Reviewed  Scheduled Meds:   ferrous sulfate  325 mg Oral TID    folic acid  1 mg Oral Daily    multivitamin  1 tablet Oral Daily    paliperidone  3 mg Oral Daily     PRN Meds:.docusate sodium, ibuprofen, loperamide, nicotine, OLANZapine **AND** OLANZapine    Overall Physical Appearance: Well Nourished    Level of Risk: Low    Nutrition Dx: No nutrition diagnosis at this time     Next Follow Up Date: 1x/wk

## 2019-05-06 NOTE — NURSING
Spoke with patient's brother, Dylon ([phone]).    Dylon said he was unable to come to family meeting due to work conflicts. I asked if he could come Tues or Wed, but he has work in the morning and can't get off.    He has been speaking with her on the phone and states that she sounds calmer and more positive. He states she sounds like this when she is on her medication.    I told Dylon I wasn't sure if patient would be d/c'd today, but would call and let him he know after she met with the doctors. He stated he would be able to pick patient up after work (around 5) when she is discharged.    Will discuss with treatment team.

## 2019-05-06 NOTE — PROGRESS NOTES
05/06/19 0900 05/06/19 1000 05/06/19 1100   [facility] Group Therapy   Group Name Community Reintegration Education Education   Specific Interventions Current Events Relapse Prevention Guided Imagery/Relaxation   Participation Level Active;Appropriate Active;Appropriate Active;Appropriate   Participation Quality Cooperative;Social Cooperative Cooperative   Insight/Motivation Good Good Good   Affect/Mood Display Appropriate Appropriate Appropriate   Cognition Alert Alert Alert      05/06/19 1200   [facility] Therapy   Group Name Therapeutic Recreation   Specific Interventions Skilled Activity Crafts   Participation Level Active;Attentive   Participation Quality Cooperative;Social   Insight/Motivation Good   Affect/Mood Display Appropriate   Cognition Alert
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
## 2019-09-02 ENCOUNTER — HOSPITAL ENCOUNTER (EMERGENCY)
Facility: HOSPITAL | Age: 34
Discharge: HOME | End: 2019-09-02
Attending: EMERGENCY MEDICINE
Payer: MEDICARE

## 2019-09-02 VITALS
HEIGHT: 63 IN | DIASTOLIC BLOOD PRESSURE: 66 MMHG | TEMPERATURE: 99 F | HEART RATE: 100 BPM | RESPIRATION RATE: 15 BRPM | WEIGHT: 156 LBS | OXYGEN SATURATION: 100 % | SYSTOLIC BLOOD PRESSURE: 131 MMHG | BODY MASS INDEX: 27.64 KG/M2

## 2019-09-02 DIAGNOSIS — R46.89 ARGUMENTATIVE BEHAVIOR: Primary | ICD-10-CM

## 2019-09-02 DIAGNOSIS — R00.0 TACHYCARDIA: ICD-10-CM

## 2019-09-02 LAB
ALBUMIN SERPL BCP-MCNC: 3.8 G/DL (ref 3.5–5.2)
ALP SERPL-CCNC: 48 U/L (ref 55–135)
ALT SERPL W/O P-5'-P-CCNC: 12 U/L (ref 10–44)
AMPHET+METHAMPHET UR QL: NEGATIVE
ANION GAP SERPL CALC-SCNC: 9 MMOL/L (ref 8–16)
APAP SERPL-MCNC: <3 UG/ML (ref 10–20)
AST SERPL-CCNC: 16 U/L (ref 10–40)
B-HCG UR QL: NEGATIVE
BACTERIA #/AREA URNS AUTO: ABNORMAL /HPF
BARBITURATES UR QL SCN>200 NG/ML: NEGATIVE
BASOPHILS # BLD AUTO: 0.03 K/UL (ref 0–0.2)
BASOPHILS NFR BLD: 0.5 % (ref 0–1.9)
BENZODIAZ UR QL SCN>200 NG/ML: NEGATIVE
BILIRUB SERPL-MCNC: 0.5 MG/DL (ref 0.1–1)
BILIRUB UR QL STRIP: NEGATIVE
BUN SERPL-MCNC: 12 MG/DL (ref 6–20)
BZE UR QL SCN: NEGATIVE
CALCIUM SERPL-MCNC: 8.8 MG/DL (ref 8.7–10.5)
CANNABINOIDS UR QL SCN: NEGATIVE
CHLORIDE SERPL-SCNC: 107 MMOL/L (ref 95–110)
CLARITY UR REFRACT.AUTO: ABNORMAL
CO2 SERPL-SCNC: 24 MMOL/L (ref 23–29)
COLOR UR AUTO: YELLOW
CREAT SERPL-MCNC: 0.8 MG/DL (ref 0.5–1.4)
CREAT UR-MCNC: 270 MG/DL (ref 15–325)
DIFFERENTIAL METHOD: ABNORMAL
EOSINOPHIL # BLD AUTO: 0 K/UL (ref 0–0.5)
EOSINOPHIL NFR BLD: 0 % (ref 0–8)
ERYTHROCYTE [DISTWIDTH] IN BLOOD BY AUTOMATED COUNT: 13.7 % (ref 11.5–14.5)
EST. GFR  (AFRICAN AMERICAN): >60 ML/MIN/1.73 M^2
EST. GFR  (NON AFRICAN AMERICAN): >60 ML/MIN/1.73 M^2
ETHANOL SERPL-MCNC: <10 MG/DL
GLUCOSE SERPL-MCNC: 128 MG/DL (ref 70–110)
GLUCOSE UR QL STRIP: NEGATIVE
HCT VFR BLD AUTO: 35.5 % (ref 37–48.5)
HGB BLD-MCNC: 10.9 G/DL (ref 12–16)
HGB UR QL STRIP: NEGATIVE
HYALINE CASTS UR QL AUTO: 8 /LPF
IMM GRANULOCYTES # BLD AUTO: 0.02 K/UL (ref 0–0.04)
IMM GRANULOCYTES NFR BLD AUTO: 0.3 % (ref 0–0.5)
KETONES UR QL STRIP: ABNORMAL
LEUKOCYTE ESTERASE UR QL STRIP: NEGATIVE
LYMPHOCYTES # BLD AUTO: 1.4 K/UL (ref 1–4.8)
LYMPHOCYTES NFR BLD: 22.1 % (ref 18–48)
MCH RBC QN AUTO: 26.1 PG (ref 27–31)
MCHC RBC AUTO-ENTMCNC: 30.7 G/DL (ref 32–36)
MCV RBC AUTO: 85 FL (ref 82–98)
METHADONE UR QL SCN>300 NG/ML: NEGATIVE
MICROSCOPIC COMMENT: ABNORMAL
MONOCYTES # BLD AUTO: 0.5 K/UL (ref 0.3–1)
MONOCYTES NFR BLD: 7.3 % (ref 4–15)
NEUTROPHILS # BLD AUTO: 4.5 K/UL (ref 1.8–7.7)
NEUTROPHILS NFR BLD: 69.8 % (ref 38–73)
NITRITE UR QL STRIP: NEGATIVE
NRBC BLD-RTO: 0 /100 WBC
OPIATES UR QL SCN: NEGATIVE
PCP UR QL SCN>25 NG/ML: NEGATIVE
PH UR STRIP: 5 [PH] (ref 5–8)
PLATELET # BLD AUTO: 314 K/UL (ref 150–350)
PMV BLD AUTO: 10.4 FL (ref 9.2–12.9)
POTASSIUM SERPL-SCNC: 3.1 MMOL/L (ref 3.5–5.1)
PROT SERPL-MCNC: 6.6 G/DL (ref 6–8.4)
PROT UR QL STRIP: ABNORMAL
RBC # BLD AUTO: 4.17 M/UL (ref 4–5.4)
RBC #/AREA URNS AUTO: 4 /HPF (ref 0–4)
SODIUM SERPL-SCNC: 140 MMOL/L (ref 136–145)
SP GR UR STRIP: 1.02 (ref 1–1.03)
SQUAMOUS #/AREA URNS AUTO: 4 /HPF
TOXICOLOGY INFORMATION: NORMAL
TSH SERPL DL<=0.005 MIU/L-ACNC: 0.51 UIU/ML (ref 0.4–4)
URN SPEC COLLECT METH UR: ABNORMAL
WBC # BLD AUTO: 6.48 K/UL (ref 3.9–12.7)
WBC #/AREA URNS AUTO: 2 /HPF (ref 0–5)

## 2019-09-02 PROCEDURE — 80307 DRUG TEST PRSMV CHEM ANLYZR: CPT

## 2019-09-02 PROCEDURE — 93010 ELECTROCARDIOGRAM REPORT: CPT | Mod: ,,, | Performed by: INTERNAL MEDICINE

## 2019-09-02 PROCEDURE — 93010 EKG 12-LEAD: ICD-10-PCS | Mod: ,,, | Performed by: INTERNAL MEDICINE

## 2019-09-02 PROCEDURE — 99285 EMERGENCY DEPT VISIT HI MDM: CPT | Mod: ,,, | Performed by: EMERGENCY MEDICINE

## 2019-09-02 PROCEDURE — 93005 ELECTROCARDIOGRAM TRACING: CPT

## 2019-09-02 PROCEDURE — 81025 URINE PREGNANCY TEST: CPT

## 2019-09-02 PROCEDURE — 80329 ANALGESICS NON-OPIOID 1 OR 2: CPT

## 2019-09-02 PROCEDURE — 81001 URINALYSIS AUTO W/SCOPE: CPT

## 2019-09-02 PROCEDURE — 99285 PR EMERGENCY DEPT VISIT,LEVEL V: ICD-10-PCS | Mod: ,,, | Performed by: EMERGENCY MEDICINE

## 2019-09-02 PROCEDURE — 99285 EMERGENCY DEPT VISIT HI MDM: CPT | Mod: 25

## 2019-09-02 PROCEDURE — 85025 COMPLETE CBC W/AUTO DIFF WBC: CPT

## 2019-09-02 PROCEDURE — 80053 COMPREHEN METABOLIC PANEL: CPT

## 2019-09-02 PROCEDURE — 84443 ASSAY THYROID STIM HORMONE: CPT

## 2019-09-02 PROCEDURE — 80320 DRUG SCREEN QUANTALCOHOLS: CPT

## 2019-09-02 RX ORDER — ARIPIPRAZOLE 10 MG/1
5 TABLET ORAL DAILY
Status: ON HOLD | COMMUNITY
End: 2019-11-11 | Stop reason: HOSPADM

## 2019-09-02 RX ORDER — FERROUS SULFATE 324(65)MG
325 TABLET, DELAYED RELEASE (ENTERIC COATED) ORAL DAILY
Status: ON HOLD | COMMUNITY
End: 2019-11-11 | Stop reason: HOSPADM

## 2019-09-02 NOTE — ED PROVIDER NOTES
Encounter Date: 9/2/2019       History     Chief Complaint   Patient presents with    Bizzare Behavior     HPI   34 Y/O F with history of bipolar disorder on Abilify and very forthcoming with her history presents voluntarily requesting assistance to get into shelter until she is able to return to [place] where her father lives.  She reports living with brother here in the oral in since 2016 and having a verbal argument with him regarding some cell phone bills, leading to his refusal to pay and there verbal argument leading to her visit here to the ED today she denies any recent illness, recent falls or injuries and assures no auditory hallucinations, sleeping 7-8 hours every night, eating 3 meals every day and no changes to her daily routine.  She denies any suicidal ideation or homicidal ideation.  She did not want me to speak to brother, but was very forthcoming with contact information of mother, sister and father's cell phones.  Otherwise no other complaints.  Review of patient's allergies indicates:  No Known Allergies  Past Medical History:   Diagnosis Date    Anemia     Mental disorder     Schizophrenia      Past Surgical History:   Procedure Laterality Date    LIGATION, FALLOPIAN TUBE, LAPAROSCOPIC Bilateral 5/8/2014    Performed [facility]issy [facility] at [facility] OR    TUBAL LIGATION       Family History   Problem Relation Age of Onset    Breast cancer Maternal Aunt     Cancer Maternal Uncle     Ovarian cancer Neg Hx     Hypertension Neg Hx     Diabetes Neg Hx      Social History     Tobacco Use    Smoking status: Never Smoker   Substance Use Topics    Alcohol use: No    Drug use: Not on file     Review of Systems  CONST: No fever, chills, weight change, or fatigue.  HEENT: No headache, blurry vision/change in vision, sore throat, ear pain, eye pain, otorrhea, rhinorrhea, tooth pain, swelling, or voice changes.  NECK: No pain, masses, trauma, or redness.  HEART: No pain, palpitations,  diaphoresis, nausea, or vomiting  LUNG: No SOB, cough, orthopnea, STOVALL or other complaints.  ABDOMEN: No pain, nausea, vomiting, diarrhea, constipation, or flank pain  : No discharge, dysuria, lesions, rashes, masses, sores  EXTREMITIES: FROM with No swelling, redness, injuries/trauma, lesions, sores, weakness, numbness, or tingling  NEURO: No dizziness, weakness, fatigue, tremors, headache, change in vision or disturbances of balance or coordination  SKIN: No lesions, rashes, trauma or other complaints    Physical Exam     Initial Vitals [09/02/19 1210]   BP Pulse Resp Temp SpO2   (!) 108/56 (!) 117 15 99.1 °F (37.3 °C) 98 %      MAP       --         Physical Exam    GENERAL:  Well-dressed; Calm; Cooperative; Well-appearing and Non-Toxic; Well-Nourished; NAD.  HEENT: AT/NC; anicteric; PERRL, EOMI, Acuity & Fields Grossly Intact; speaking full sentences with no slurring of speech or drooling/inability to tolerate oral secretions.  NECK: Supple, FROM with no meningismus, no accessory muscle use. No JVD or Carotid Bruits B/L.  HEART: Regular rate and rhythm, no M/G/T.  LUNGS: No Tachypnea, No Retractions, and CTA B/L with no W/R/R.  ABDOMEN: +BS, Soft, ND, NTTP. No rigidity. No guarding. NEG Gary's, Rovsing's, or McBurney's point tenderness.  BACK: Atraumatic, No midline TTP to C/T/LS spine; No CVA tenderness B/L. SLRT NEG.  EXTREMITIES: FROM. Strength 5/5. Symmetrical Sensorium and with no deficits. Soft Comparments.  SKIN: Warm, Dry, No Skin Tears or Rashes.  VASCULAR: 2+ pulses Prox/Dist & Symmetrical with No delay.  NEUROLOGIC: AAOx3; No Receptive or Expressive Aphasia; Answering Questions Appropriately; Recent & Remote Memory Intact; No Visual or Tactile Agnosia to B/L; CN/PN Intact; Strength 5/5 and Sens Symmetrical to UE & LE; No Ataxia, NEG Romberg's, and Grossly Intact FTN, HTS & Rapid Alt Hand Motions. Able to March in Place with no deficits.  PSYCH:  Well dressed; nondistressed of old; with clear speech  "of normal rate and tone; Calm, cooperative with linear thought process; no suicidal homicidal ideation; normal mood and affect and oriented to person time in place with intact recent/remote memory and demonstrating appropriate insight and judgment.    ED Course   Procedures  Labs Reviewed - No data to display  EKG Readings: (Independently Interpreted)   Sinus rhythm at a rate of 93 beats per minute with normal ventricular axis; WV/QRS/QTC intervals within normal limits with no STEMI.  ____________________  Dinesh Levi MD, [facility]  Emergency Medicine Staff  1:20 PM 9/2/2019         Imaging Results    None          Medical Decision Making:   History:   Old Medical Records: I decided to obtain old medical records.  Initial Assessment:   Afebrile, atraumatic and hemodynamically stable female with history of anxiety depression presents voluntarily status post argument with brother, whom she has been living with since 2016.  She would like to speak to her father, who lives in [place], [phone] "[facility] as she does not want to live with brother any longer".  On my exam, patient is alert awake and oriented x3 answering questions appropriately, calm and cooperative in no acute distress. Although unusual, I do not perceive any grave disability or threat to self or others at this time consistent with her lack of suicidal ideation or homicidal ideation.  Will discuss with father and attempt to find better collateral history.  I will not PEC this patient at this time as she is not a threat to herself, a threat to others or gravely disabled.  Called father at above-stated number, but phone went directly to for full mailbox.  Will attempt to call again later.  ____________________  Dinesh Levi MD, [facility]  Emergency Medicine Staff  12:47 PM 9/2/2019    F/U:  Have attempted above-mentioned number for dad and mother, Ms. Sullivan [phone], and sister Ms. Nation [phone] on 2-3 occasions with no ability to speak with anybody.  All " mailboxes are full and unable to provide the message.  ____________________  Dinesh Levi MD, [facility]  Emergency Medicine Staff  2:38 PM 9/2/2019    F/U:  Patient continues in no acute distress.  She is tolerating p.o..  Have attempted to obtain collateral from multiple phone numbers and family members without resolved.  Given the patient not a threat to self, not a threat to others or gravely disabled will provide outpatient resources and discharge as she does not meet criteria for PEC at this time.  ____________________  Dinesh Levi MD, [facility]  Emergency Medicine Staff  2:40 PM 9/2/2019      Clinical Tests:   Lab Tests: Ordered  Radiological Study: Ordered  Medical Tests: Ordered                      Clinical Impression:       ICD-10-CM ICD-9-CM   1. Argumentative behavior R46.89 780.99   2. Tachycardia R00.0 785.0                                    Rafael Levi MD  09/02/19 1522       Rafael Levi MD  09/02/19 1522

## 2019-09-02 NOTE — ED NOTES
Bed: 09  Expected date: 9/2/19  Expected time: 12:10 PM  Means of arrival:   Comments:  CODE WATCH

## 2019-09-02 NOTE — DISCHARGE INSTRUCTIONS
PSYCH CLINICS:  Please follow up with a primary care doctor for recheck and routine health maintenance.  Critical access hospital Clinics:  Ramy Su [address] [facility] [address] [phone]  Dima William [address] [facility] [phone]  Mykel Jamal [address] [phone]  Shlomo Abreu [address] [facility] [address] [facility] [address] [phone]  [facility] (HIV) [address] [facility] [address] [phone]  [facility] [phone]  [facility] [address] [phone]  [facility] [address] [phone]  [facility] [address] [facility] [phone]  [facility] [address] [phone]  [facility] [address] [facility] [facility] [address] [phone]  [facility] [phone]  Mirna Tasneem [address] [facility] [phone]  [facility] ST[facility] [address] [facility] [phone]  [facility] [address] [phone]  [facility] [address] [facility] [phone]  [facility] [address] [facility], [phone]  Mental Health Clinics (MHC):  [facility] Substance Abuse Services [address] [facility] [phone]  [facility] [address] [facility] [phone]  [place] [address] Yo[address] [phone]  [facility] [address] [facility]. [phone]  [facility] [address] [phone]   MHC [address] [facility] [phone]  [facility] [address] [facility] [phone]  [facility] [address] [facility] [phone]  Alzheimer&#39;s Care Enrichment Program [phone]  Medicare and Medicaid Services [phone]    [facility] Supported Outpatient Clinics:  Risa Mora [facility], [address] [facility] [place] , [phone]  [facility], [address] [facility], [phone]  [facility]: [address], [place], [phone]  [facility], [address] [place] [address] [phone]  [facility] at [facility]: [address] [facility], [phone]  [facility], [address] [place] [address], [phone], [phone] [facility] and Private Outpatient Clinics:  [facility], [facility],  Kimberly Bldg 3 rd floor, [facility], [phone], Iam Rivas Barbie  [facility] Outpx Clinic, 4 th fl [facility], [address] [place] [address], [place] [phone]  [facility]:: [address] [place] [address], [phone], [phone]  [facility], [address] [facility], [address], [phone], Dr. Leatha Guajardo, Chief Psychiatrist Outpx Group Therapy  Cancer Support Group: [facility], [address] Call Sandeep Lowery, [phone]  Depression/Bipolar Support [place]: [facility], [address], [place], 7:30pm 1 st &amp; 3 rd Tues, Cafeteria, [phone]  Heart Transplant Support Group: 3 rd Wed. 7 th Floor Conference Room, [facility], [phone], [facility]: [facility] [place] [facility] ([place]ANEL) - [address] [place] [facility] [place] [address] - Call [phone]  [facility], [facility] room 458, [address] [place] [address], Leticia Lemus RN, [phone]    OUTPATIENT DRUG REHABILITATION:  Alcoholics Anonymous: AA at [facility], [address], Wednesdays 7pm, call [phone]  [facility]: [address] [facility], [place] [phone], Vince Duongpen ext [phone] Tuesdays 10am  [facility] Substance Abuse Clinic, [address], [place] [phone]  [facility]: Waqar Tang MD, [phone]  [facility] [place], [address], [place] [address], [phone]  [place] [facility], [address] [facility], [address], [phone]    Inpatient Drug Rehab:  [facility]: [address], Males and Females, [phone]  Merged with [facility]: [address], females only, [phone], ext [phone], contact Mady Gonzales  [facility] [address] [facility], [phone]  Responsibility House: [address], [place] [address], [phone]  Prue, [address] [place] [address], fee based:  [phone]  [facility] rehabilitation program: [phone]    Elderly Services:  Adult Protective Services: [phone]  Bereavement Support Group, [facility], 1 st &amp; 3 rd Tues, [address] [facility], [phone], Krzysztof Falcon, Ms Orion, therapists  Case Management for Sen[facility]- [address] [facility], [address], [place] [address] - Call [phone]  [facility] on [address] Tom Valderrama, [place], [phone]  [facility] -. [address] [place], [place] [address] [phone] ext [phone]  [facility]:. - [address] [facility], [address], [place] [address] - Call [phone] or  [place] on Aging: Information on Aging Services - [address] [facility], [address], [place] [address] - call [phone]

## 2019-09-02 NOTE — ED NOTES
Spoke with case management and Samaria. Pt has a ride scheduled to [facility] to arrive before 4pm.

## 2019-09-02 NOTE — ED TRIAGE NOTES
Faye hKalil, a 33 y.o. female presents to the ED w/ complaint of getting in an argument with her brother about finances and housing and voluntarily came to the ER because she is looking for housing. Denies any medical complaints, pain, trauma, injury.    Triage note:  Chief Complaint   Patient presents with    Bizzare Behavior     Review of patient's allergies indicates:  No Known Allergies  Past Medical History:   Diagnosis Date    Anemia     Mental disorder     Schizophrenia      Adult Physical Assessment  LOC: Faye Khalil, 33 y.o. female verified via two identifiers.  The patient is awake, alert, oriented and speaking appropriately at this time.  APPEARANCE: Patient resting comfortably and appears to be in no acute distress at this time. Patient is clean and well groomed, patient's clothing is properly fastened.  SKIN:The skin is warm and dry, color consistent with ethnicity, patient has normal skin turgor and moist mucus membranes, skin intact, no breakdown or brusing noted.  MUSCULOSKELETAL: Patient moving all extremities well, no obvious swelling or deformities noted.  RESPIRATORY: Airway is open and patent, respirations are spontaneous, patient has a normal effort and rate, no accessory muscle use noted.  CARDIAC: Patient has a normal rate and rhythm, no periphreal edema noted in any extremity, capillary refill < 3 seconds in all extremities  ABDOMEN: Soft and non tender to palpation, no abdominal distention noted. Bowel sounds present in all four quadrants.  NEUROLOGIC: Eyes open spontaneously, behavior appropriate to situation, follows commands, facial expression symmetrical, bilateral hand grasp equal and even, purposeful motor response noted, normal sensation in all extremities when touched with a finger.

## 2019-09-02 NOTE — PLAN OF CARE
SW received call from ED RN stating that pt wants to go to a homeless shelter and will need a ride.     SW spoke with patient. Patient states that she can not return to her brother's or sister's house. Pt states that [facility]. SW arranged transportation for  as soon as possible to allow pt to make it to the shelter before cut off time.     RN aware.     Samaria Lazaro LMSW  [facility]

## 2019-10-31 ENCOUNTER — HOSPITAL ENCOUNTER (EMERGENCY)
Facility: HOSPITAL | Age: 34
Discharge: PSYCHIATRIC OR SUBSTANCE USE FACILITY | End: 2019-10-31
Attending: EMERGENCY MEDICINE
Payer: MEDICARE

## 2019-10-31 VITALS
HEART RATE: 81 BPM | DIASTOLIC BLOOD PRESSURE: 54 MMHG | BODY MASS INDEX: 29.23 KG/M2 | RESPIRATION RATE: 16 BRPM | HEIGHT: 63 IN | SYSTOLIC BLOOD PRESSURE: 108 MMHG | OXYGEN SATURATION: 97 % | WEIGHT: 165 LBS | TEMPERATURE: 98 F

## 2019-10-31 DIAGNOSIS — F23 ACUTE PSYCHOSIS: Primary | ICD-10-CM

## 2019-10-31 DIAGNOSIS — F22 PARANOIA: ICD-10-CM

## 2019-10-31 PROBLEM — F20.9 SCHIZOPHRENIA: Status: ACTIVE | Noted: 2019-10-31

## 2019-10-31 LAB
ALBUMIN SERPL BCP-MCNC: 4.3 G/DL (ref 3.5–5.2)
ALP SERPL-CCNC: 49 U/L (ref 55–135)
ALT SERPL W/O P-5'-P-CCNC: 12 U/L (ref 10–44)
AMPHET+METHAMPHET UR QL: NEGATIVE
ANION GAP SERPL CALC-SCNC: 9 MMOL/L (ref 8–16)
AST SERPL-CCNC: 19 U/L (ref 10–40)
B-HCG UR QL: NEGATIVE
BARBITURATES UR QL SCN>200 NG/ML: NEGATIVE
BASOPHILS # BLD AUTO: 0.03 K/UL (ref 0–0.2)
BASOPHILS NFR BLD: 0.7 % (ref 0–1.9)
BENZODIAZ UR QL SCN>200 NG/ML: NEGATIVE
BILIRUB SERPL-MCNC: 0.6 MG/DL (ref 0.1–1)
BILIRUB UR QL STRIP: NEGATIVE
BUN SERPL-MCNC: 10 MG/DL (ref 6–20)
BZE UR QL SCN: NEGATIVE
CALCIUM SERPL-MCNC: 10.2 MG/DL (ref 8.7–10.5)
CANNABINOIDS UR QL SCN: NEGATIVE
CHLORIDE SERPL-SCNC: 105 MMOL/L (ref 95–110)
CLARITY UR REFRACT.AUTO: CLEAR
CO2 SERPL-SCNC: 25 MMOL/L (ref 23–29)
COLOR UR AUTO: YELLOW
CREAT SERPL-MCNC: 0.7 MG/DL (ref 0.5–1.4)
CREAT UR-MCNC: 81 MG/DL (ref 15–325)
CTP QC/QA: YES
DIFFERENTIAL METHOD: ABNORMAL
EOSINOPHIL # BLD AUTO: 0 K/UL (ref 0–0.5)
EOSINOPHIL NFR BLD: 0.4 % (ref 0–8)
ERYTHROCYTE [DISTWIDTH] IN BLOOD BY AUTOMATED COUNT: 15.4 % (ref 11.5–14.5)
EST. GFR  (AFRICAN AMERICAN): >60 ML/MIN/1.73 M^2
EST. GFR  (NON AFRICAN AMERICAN): >60 ML/MIN/1.73 M^2
ETHANOL SERPL-MCNC: <10 MG/DL
GLUCOSE SERPL-MCNC: 83 MG/DL (ref 70–110)
GLUCOSE UR QL STRIP: NEGATIVE
HCT VFR BLD AUTO: 37.6 % (ref 37–48.5)
HGB BLD-MCNC: 11.8 G/DL (ref 12–16)
HGB UR QL STRIP: NEGATIVE
IMM GRANULOCYTES # BLD AUTO: 0.01 K/UL (ref 0–0.04)
IMM GRANULOCYTES NFR BLD AUTO: 0.2 % (ref 0–0.5)
KETONES UR QL STRIP: NEGATIVE
LEUKOCYTE ESTERASE UR QL STRIP: NEGATIVE
LYMPHOCYTES # BLD AUTO: 2 K/UL (ref 1–4.8)
LYMPHOCYTES NFR BLD: 45.3 % (ref 18–48)
MCH RBC QN AUTO: 26.8 PG (ref 27–31)
MCHC RBC AUTO-ENTMCNC: 31.4 G/DL (ref 32–36)
MCV RBC AUTO: 86 FL (ref 82–98)
METHADONE UR QL SCN>300 NG/ML: NEGATIVE
MONOCYTES # BLD AUTO: 0.5 K/UL (ref 0.3–1)
MONOCYTES NFR BLD: 11.3 % (ref 4–15)
NEUTROPHILS # BLD AUTO: 1.9 K/UL (ref 1.8–7.7)
NEUTROPHILS NFR BLD: 42.1 % (ref 38–73)
NITRITE UR QL STRIP: NEGATIVE
NRBC BLD-RTO: 0 /100 WBC
OPIATES UR QL SCN: NEGATIVE
PCP UR QL SCN>25 NG/ML: NEGATIVE
PH UR STRIP: >8 [PH] (ref 5–8)
PLATELET # BLD AUTO: 263 K/UL (ref 150–350)
PMV BLD AUTO: ABNORMAL FL (ref 9.2–12.9)
POTASSIUM SERPL-SCNC: 4.3 MMOL/L (ref 3.5–5.1)
PROT SERPL-MCNC: 7.9 G/DL (ref 6–8.4)
PROT UR QL STRIP: NEGATIVE
RBC # BLD AUTO: 4.4 M/UL (ref 4–5.4)
SODIUM SERPL-SCNC: 139 MMOL/L (ref 136–145)
SP GR UR STRIP: 1.01 (ref 1–1.03)
TOXICOLOGY INFORMATION: NORMAL
TSH SERPL DL<=0.005 MIU/L-ACNC: 1.31 UIU/ML (ref 0.4–4)
URN SPEC COLLECT METH UR: ABNORMAL
WBC # BLD AUTO: 4.5 K/UL (ref 3.9–12.7)

## 2019-10-31 PROCEDURE — 80053 COMPREHEN METABOLIC PANEL: CPT

## 2019-10-31 PROCEDURE — 80307 DRUG TEST PRSMV CHEM ANLYZR: CPT

## 2019-10-31 PROCEDURE — 99285 EMERGENCY DEPT VISIT HI MDM: CPT | Mod: 25

## 2019-10-31 PROCEDURE — 99285 PR EMERGENCY DEPT VISIT,LEVEL V: ICD-10-PCS | Mod: ,,, | Performed by: EMERGENCY MEDICINE

## 2019-10-31 PROCEDURE — 99285 EMERGENCY DEPT VISIT HI MDM: CPT | Mod: ,,, | Performed by: EMERGENCY MEDICINE

## 2019-10-31 PROCEDURE — 80320 DRUG SCREEN QUANTALCOHOLS: CPT

## 2019-10-31 PROCEDURE — 84443 ASSAY THYROID STIM HORMONE: CPT

## 2019-10-31 PROCEDURE — 81025 URINE PREGNANCY TEST: CPT | Performed by: PHYSICIAN ASSISTANT

## 2019-10-31 PROCEDURE — 81003 URINALYSIS AUTO W/O SCOPE: CPT | Mod: 59

## 2019-10-31 PROCEDURE — 85025 COMPLETE CBC W/AUTO DIFF WBC: CPT

## 2019-10-31 RX ORDER — BUPROPION HYDROCHLORIDE 100 MG/1
100 TABLET ORAL 2 TIMES DAILY
Status: ON HOLD | COMMUNITY
End: 2019-11-11 | Stop reason: HOSPADM

## 2019-10-31 NOTE — ED TRIAGE NOTES
"Pt arrives with complaint of "panic attacks and anger for a few days". Pt states she is stressed about living with her brother and wants to stay with her father but has no transportation to get there. Pt states during her panic attacks she has racing thoughts, and feels her heart beating fast. Pt denies auditory and visual hallucinations.    Pt states she wants to fight her neighbor across the street because she sees her talking about her on the phone and looking at her.  "

## 2019-10-31 NOTE — ED NOTES
Pt transferred to  2, she is calm and cooperative. Pt thoughts are paranoid and disorganized. Pt denies SI/HI/AVH's. Pt ID checked using two person identifier. Pt checked for contraband and wearing paper scrubs. Pt belongings secured and PEC called into the coroners office. Pt instructed on PEC placement she verbalizes understanding.

## 2019-10-31 NOTE — ED PROVIDER NOTES
"Encounter Date: 10/31/2019       History     Chief Complaint   Patient presents with    Psychiatric Evaluation     arrived via [facility] with c/o anxiety, EMS reports patient with paranoi and thoughts of HI     9:36 AM  Patient is a 34 year old female who presents to the ED via EMS for psychiatric evaluation vs social issues.  Patient states that she has been getting in verbal arguments with her siblings, specifically brother, who is taking her social security checks.  Last argument was yesterday.  She states that she does not want to live with him anymore, and her father recently stated that she can come live with him which she plans on to tomorrow.  However, patient states that she needs to get her checked from her brother.  She denies wanting to harm her brother.  She also notes that there is this lady across history the keeps "staring" at her and this she is talking about patient. She does not want to hurt this lady either.  She denies any suicidal ideations, visual or auditory hallucinations, or depression.  When asked if she feels if she needs inpatient psychiatric care, she states no and that she just wants her social security checks and to be transported to her father's house. She is only compliant with her Wellbutrin.           Review of patient's allergies indicates:  No Known Allergies  Past Medical History:   Diagnosis Date    Anemia     Depression     Mental disorder     Schizophrenia      Past Surgical History:   Procedure Laterality Date    TUBAL LIGATION       Family History   Problem Relation Age of Onset    Breast cancer Maternal Aunt     Cancer Maternal Uncle     Ovarian cancer Neg Hx     Hypertension Neg Hx     Diabetes Neg Hx      Social History     Tobacco Use    Smoking status: Never Smoker   Substance Use Topics    Alcohol use: No    Drug use: Never     Review of Systems   Constitutional: Negative for chills and fever.   HENT: Negative for sore throat.    Eyes: Negative for " photophobia.   Respiratory: Negative for shortness of breath.    Cardiovascular: Negative for chest pain.   Gastrointestinal: Negative for nausea.   Endocrine: Negative for polyphagia.   Genitourinary: Negative for dysuria.   Musculoskeletal: Negative for back pain.   Skin: Negative for rash.   Neurological: Negative for weakness.   Hematological: Does not bruise/bleed easily.   Psychiatric/Behavioral: Negative for behavioral problems, confusion, dysphoric mood, hallucinations, self-injury, sleep disturbance and suicidal ideas. The patient is not nervous/anxious.        Physical Exam     Initial Vitals   BP Pulse Resp Temp SpO2   10/31/19 0910 10/31/19 0910 10/31/19 0910 10/31/19 1100 10/31/19 0910   110/70 88 16 97.5 °F (36.4 °C) 98 %      MAP       --                Physical Exam    Vitals reviewed.  Constitutional: She appears well-developed and well-nourished. She is not diaphoretic. No distress.   HENT:   Head: Normocephalic and atraumatic.   Nose: Nose normal.   Eyes: Conjunctivae and EOM are normal.   Neck: Normal range of motion.   Cardiovascular: Normal rate.   Pulmonary/Chest: Breath sounds normal. No respiratory distress. She has no wheezes. She has no rales.   Abdominal: Soft. She exhibits no distension. There is no tenderness. There is no rebound.   Musculoskeletal: Normal range of motion.   Neurological: She is alert and oriented to person, place, and time. She has normal strength. No sensory deficit.   Skin: Skin is warm and dry. No erythema. No pallor.   Psychiatric: Judgment normal. Her speech is tangential. Her speech is not rapid and/or pressured and not delayed. She is withdrawn. She is not agitated, not aggressive and not actively hallucinating. Thought content is paranoid. Cognition and memory are not impaired. She expresses no homicidal and no suicidal ideation. She expresses no suicidal plans and no homicidal plans.   Bizarre mood and affect. Not making eye contact. Somewhat tangential  speech.          ED Course   Procedures  Labs Reviewed   CBC W/ AUTO DIFFERENTIAL - Abnormal; Notable for the following components:       Result Value    Hemoglobin 11.8 (*)     Mean Corpuscular Hemoglobin 26.8 (*)     Mean Corpuscular Hemoglobin Conc 31.4 (*)     RDW 15.4 (*)     All other components within normal limits   COMPREHENSIVE METABOLIC PANEL - Abnormal; Notable for the following components:    Alkaline Phosphatase 49 (*)     All other components within normal limits   TSH   ALCOHOL,MEDICAL (ETHANOL)   URINALYSIS, REFLEX TO URINE CULTURE   DRUG SCREEN PANEL, URINE EMERGENCY   POCT URINE PREGNANCY          Imaging Results    None          Medical Decision Making:   History:   Old Medical Records: I decided to obtain old medical records.  Old Records Summarized: records from clinic visits and records from previous admission(s).       <> Summary of Records: [facility] for 18 days on 4/18/19 - 5/06/19 for acute psychosis.   Initial Assessment:   Patient is a 34 year old female who presents to the ED via EMS for psychiatric evaluation vs social issue.   Differential Diagnosis:   Includes but is not limited to acute psychosis, non compliance to medication, schizophrenia, social issue, financial issue, and less likely suicidal homicidal ideations.  Clinical Tests:   Lab Tests: Ordered and Reviewed  ED Management:  Patient states she call [phone] because she needs help getting her social security checks from her brother who she has been living with, but has no interest in living with him anymore.  Her father recently told her that she could stay at his place which she is interested in, however she states that she needs to get her checked him a brother 1st and then transportation to her fathers place. Some paranoia exhibited with her brother taking her checks and lady across the street. Denies any suicidal or homicidal ideations, including to her brother.  She denies any  hallucinations or depression. Unsure if this patient needs inpatient psychiatry care at this time, but given history of psychosis and schizophrenia, will consult psychiatry for their recommendation.    10:57 AM.  Psychiatry has recommended inpatient admission given her history and presentation. Please refer their note. He states that he contacted [facility] and they recommend treatment at [facility] if possible. Will transfer to psychiatric facility once accepted.   Other:   I have discussed this case with another health care provider.    I have reviewed patient's chart and discussed this case with my supervising MD.     Shelia [place] PA-C  Emergent Department  [facility]  Spectralink #[id number] or #[id number]                        Clinical Impression:       ICD-10-CM ICD-9-CM   1. Acute psychosis F23 298.9   2. Paranoia F22 297.1         Disposition:   Disposition: Transferred  Condition: Stable                        Shelia [place] PA-C  10/31/19 4937
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
## 2019-10-31 NOTE — DISCHARGE INSTRUCTIONS
No future appointments.    Our goal in the emergency department is to always give you outstanding care and exceptional service. You may receive a survey by mail or e-mail in the next week regarding your experience in our ED. We would greatly appreciate your completing and returning the survey. Your feedback provides us with a way to recognize our staff who give very good care and it helps us learn how to improve when your experience was below our aspiration of excellence.

## 2019-10-31 NOTE — ED NOTES
Pt escorted to [facility] transportation vehicle. Pt belongings and PEC given to [facility] staff. Pt was anxious but cooperative.

## 2019-11-01 PROBLEM — F32.A FATIGUE DUE TO DEPRESSION: Status: ACTIVE | Noted: 2019-11-01

## 2019-11-01 PROBLEM — R53.83 FATIGUE DUE TO DEPRESSION: Status: ACTIVE | Noted: 2019-11-01

## 2020-03-22 ENCOUNTER — HOSPITAL ENCOUNTER (EMERGENCY)
Facility: HOSPITAL | Age: 35
Discharge: HOME | End: 2020-03-22
Attending: EMERGENCY MEDICINE
Payer: MEDICARE

## 2020-03-22 VITALS
SYSTOLIC BLOOD PRESSURE: 114 MMHG | BODY MASS INDEX: 31.54 KG/M2 | OXYGEN SATURATION: 100 % | HEIGHT: 63 IN | RESPIRATION RATE: 18 BRPM | TEMPERATURE: 98 F | WEIGHT: 178 LBS | HEART RATE: 78 BPM | DIASTOLIC BLOOD PRESSURE: 55 MMHG

## 2020-03-22 DIAGNOSIS — Z00.00 WELL ADULT EXAM: Primary | ICD-10-CM

## 2020-03-22 PROCEDURE — 99285 EMERGENCY DEPT VISIT HI MDM: CPT

## 2020-03-22 PROCEDURE — 99281 EMR DPT VST MAYX REQ PHY/QHP: CPT

## 2020-03-22 NOTE — ED NOTES
Pt directed to waiting room to wait for transportation being arranged through [facility].  Pt going back to brothers house which is where she has been living.

## 2020-03-22 NOTE — ED NOTES
Pt dressed in paper scrubs, belongings put in bags and labeled, put in locked closet.  Pt scanned and wanded by security.
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
## 2020-04-12 ENCOUNTER — HOSPITAL ENCOUNTER (EMERGENCY)
Facility: HOSPITAL | Age: 35
Discharge: HOME | End: 2020-04-12
Attending: EMERGENCY MEDICINE
Payer: MEDICARE

## 2020-04-12 VITALS
RESPIRATION RATE: 14 BRPM | HEART RATE: 94 BPM | TEMPERATURE: 98 F | BODY MASS INDEX: 29.44 KG/M2 | OXYGEN SATURATION: 100 % | DIASTOLIC BLOOD PRESSURE: 71 MMHG | WEIGHT: 160 LBS | HEIGHT: 62 IN | SYSTOLIC BLOOD PRESSURE: 135 MMHG

## 2020-04-12 DIAGNOSIS — Z71.1 CONCERN ABOUT SEXUALLY TRANSMITTED DISEASE IN FEMALE WITHOUT DIAGNOSIS: Primary | ICD-10-CM

## 2020-04-12 LAB
B-HCG UR QL: NEGATIVE
BILIRUB UR QL STRIP: NEGATIVE
CLARITY UR: CLEAR
COLOR UR: YELLOW
CTP QC/QA: YES
GLUCOSE UR QL STRIP: NEGATIVE
HGB UR QL STRIP: ABNORMAL
KETONES UR QL STRIP: ABNORMAL
LEUKOCYTE ESTERASE UR QL STRIP: NEGATIVE
MICROSCOPIC COMMENT: NORMAL
NITRITE UR QL STRIP: NEGATIVE
PH UR STRIP: 6 [PH] (ref 5–8)
PROT UR QL STRIP: NEGATIVE
RBC #/AREA URNS HPF: 3 /HPF (ref 0–4)
SP GR UR STRIP: 1.02 (ref 1–1.03)
URN SPEC COLLECT METH UR: ABNORMAL
UROBILINOGEN UR STRIP-ACNC: 1 EU/DL
WBC #/AREA URNS HPF: 1 /HPF (ref 0–5)

## 2020-04-12 PROCEDURE — 81025 URINE PREGNANCY TEST: CPT | Performed by: EMERGENCY MEDICINE

## 2020-04-12 PROCEDURE — 63700000 PHARM REV CODE 250 ALT 637 W/O HCPCS: Performed by: EMERGENCY MEDICINE

## 2020-04-12 PROCEDURE — 81000 URINALYSIS NONAUTO W/SCOPE: CPT

## 2020-04-12 PROCEDURE — 99283 EMERGENCY DEPT VISIT LOW MDM: CPT

## 2020-04-12 RX ORDER — AZITHROMYCIN 250 MG/1
1000 TABLET, FILM COATED ORAL
Status: DISCONTINUED | OUTPATIENT
Start: 2020-04-12 | End: 2020-04-12 | Stop reason: HOSPADM

## 2020-04-12 RX ORDER — CEFTRIAXONE 250 MG/1
250 INJECTION, POWDER, FOR SOLUTION INTRAMUSCULAR; INTRAVENOUS
Status: DISCONTINUED | OUTPATIENT
Start: 2020-04-12 | End: 2020-04-12 | Stop reason: HOSPADM

## 2020-04-12 NOTE — ED NOTES
"Attempted to administer antibiotics, patient stated' I can get them from my doctor next week". Explained to patient the risks to refusing medication, pt verbalized understanding. PT requested ride home, charge RN notified and requested. Pt walked to wait room with steady gait, in NAD  "

## 2020-04-12 NOTE — ED NOTES
"Pt has multiple complaints, states "my lips hurt because I put soap on them, I have a UTI from 6 months ago and need more antibiotics, I had a possible STI exposure in February and would like to be tested for HIV". Pt denies CP, SOB, fever, burning during urination, nausea/vomiting. Pt reports taking wellbutrin. No SI/HI. Pt in NAD.   "

## 2020-04-12 NOTE — DISCHARGE INSTRUCTIONS
You will need to follow-up with your PCP or a formal Sexual Health Clinic for further evaluation/management and test of cure.   You will also need further testing to evaluate for other types of sexually transmitted infections that were not evaluated today.  Refrain from any sexual contact until successfully tested and completely treated.  Inform all sexual partners of the need for testing and treatment.

## 2020-04-12 NOTE — ED PROVIDER NOTES
Encounter Date: 4/12/2020       History     Chief Complaint   Patient presents with    Labs Only     pt reports possible STI exposure in Feb, would like HIV testing     Faye Khalil is a 34 y.o. female who  has a past medical history of Anemia, Depression, Mental disorder, and Schizophrenia.    The patient presents to the ED due to concern for STD.  She reports unprotected intercourse in February of this year, and is concerned she may have an STD.   She denies any fever, N/V, dysuria, abdominal pain, or vaginal discharge. She reports she is currently on her menstrual cycle.  She initially stated she wanted to be checked for HIV, but later states she did not want to be tested. She denies CP, SOB, cough, sick contacts, or any other complaints currently.        Review of patient's allergies indicates:  No Known Allergies  Past Medical History:   Diagnosis Date    Anemia     Depression     Mental disorder     Schizophrenia      Past Surgical History:   Procedure Laterality Date    TUBAL LIGATION       Family History   Problem Relation Age of Onset    Breast cancer Maternal Aunt     Cancer Maternal Uncle     Ovarian cancer Neg Hx     Hypertension Neg Hx     Diabetes Neg Hx      Social History     Tobacco Use    Smoking status: Never Smoker   Substance Use Topics    Alcohol use: No    Drug use: Never     Review of Systems   Constitutional: Negative for chills and fever.   HENT: Negative for sore throat.    Respiratory: Negative for cough and shortness of breath.    Cardiovascular: Negative for chest pain.   Gastrointestinal: Negative for nausea and vomiting.   Genitourinary: Positive for vaginal bleeding. Negative for difficulty urinating, dyspareunia, dysuria, flank pain, frequency, hematuria, urgency and vaginal discharge.   Musculoskeletal: Negative for back pain.   Skin: Negative for rash and wound.   Neurological: Negative for syncope and weakness.   Hematological: Does not bruise/bleed easily.    Psychiatric/Behavioral: Negative for agitation, behavioral problems and confusion.       Physical Exam     Initial Vitals [04/12/20 1215]   BP Pulse Resp Temp SpO2   (!) 118/57 91 16 98.1 °F (36.7 °C) 100 %      MAP       --         Physical Exam    Nursing note and vitals reviewed.  Constitutional: She appears well-developed and well-nourished. She is not diaphoretic. No distress.   HENT:   Head: Normocephalic and atraumatic.   Mouth/Throat: Oropharynx is clear and moist.   Eyes: EOM are normal. Pupils are equal, round, and reactive to light.   Neck: No tracheal deviation present.   Cardiovascular: Normal rate, regular rhythm, normal heart sounds and intact distal pulses.   Pulmonary/Chest: Breath sounds normal. No stridor. No respiratory distress. She has no wheezes.   Abdominal: Soft. Bowel sounds are normal. She exhibits no distension and no mass. There is no tenderness. There is no rigidity, no rebound, no guarding and no CVA tenderness.   Pelvic exam deferred, patient is asymptomatic.   Musculoskeletal: Normal range of motion. She exhibits no edema.   Neurological: She is alert and oriented to person, place, and time. She has normal strength. No cranial nerve deficit or sensory deficit.   Skin: Skin is warm and dry. Capillary refill takes less than 2 seconds. No pallor.   Psychiatric: She has a normal mood and affect. Her behavior is normal. Thought content normal.         ED Course   Procedures  Labs Reviewed   URINALYSIS, REFLEX TO URINE CULTURE   URINALYSIS MICROSCOPIC   POCT URINE PREGNANCY          Imaging Results    None          Medical Decision Making:   History:   Old Medical Records: I decided to obtain old medical records.  Old Records Summarized: other records.  Initial Assessment:   33 yo F with schizophrenia, depression presents to ED with concern for STD. Denies any dysuria, abdominal pain, rash/lesions, vagina discharge, or any other complaints.  Vitals and exam reassuring.  Will obtain  UPT/UA, treat empirically with Rocephin/azithromycin, and reassess.  Differential Diagnosis:   Differential Diagnosis includes, but is not limited to:  STI, HSV, BV, PID, TOA, ovarian torsion, ovarian cyst, UTI/pyelonephritis, pregnancy complication, dysmenorrhea, mittelschmertz    Clinical Tests:   Lab Tests: Ordered and Reviewed  ED Management:  UPT negative.  UA without infection.     After complete evaluation, including thorough history and physical exam, the patient was felt to be at high enough risk of STI to warrant empiric treatment in the ED with IM Rocephin and PO azithromycin. However, she refused treatment in the ED.    The patient was counseled extensively on sexual health, including the need to f/u with PCP or other formal STI clinic for further evaluation/management and test of cure. The patient was instructed to refrain from further sexual activity until successfully tested and treated, and to inform any/all partners of their need for testing and treatment. Patient stated understanding.      On re-evaluation, the patient's status has improved.  After complete ED evaluation, clinical impression is most consistent with STD check.  PCP follow-up within 2-3 days was recommended.    After taking into careful account the patient's history, physical exam findings, as well as empirical and objective data obtained throughout ED workup, I feel no emergent medical condition has been identified. No further evaluation or admission was felt to be required, and the patient is stable for discharge from the ED. The patient and any additional family present were updated with test results, overall clinical impression, and recommended further plan of care, including discharge instructions as provided and outpatient follow-up for continued evaluation and management as needed. All questions were answered. The patient expressed understanding and agreed with current plan for discharge and follow-up plan of care. Strict ED  return precautions were provided, including return/worsening of current symptoms, new symptoms, or any other concerns.                                   Clinical Impression:       ICD-10-CM ICD-9-CM   1. Concern about sexually transmitted disease in female without diagnosis Z71.1 V65.5             ED Disposition Condition    Discharge Stable        ED Prescriptions     None        Follow-up Information     Follow up With Specialties Details Why Contact Info    Marco Olivarez NP Family Medicine Schedule an appointment as soon as possible for a visit   [address] [facility]  [address]  [place] [address]  [phone]                                       Tony Adams MD  04/12/20 3303

## 2020-04-18 ENCOUNTER — OFFICE VISIT (OUTPATIENT)
Dept: URGENT CARE | Facility: CLINIC | Age: 35
End: 2020-04-18
Payer: MEDICARE

## 2020-04-18 VITALS
OXYGEN SATURATION: 97 % | HEIGHT: 62 IN | BODY MASS INDEX: 29.44 KG/M2 | HEART RATE: 128 BPM | WEIGHT: 160 LBS | RESPIRATION RATE: 16 BRPM | TEMPERATURE: 97 F

## 2020-04-18 DIAGNOSIS — Z53.21 PATIENT LEFT WITHOUT BEING SEEN: Primary | ICD-10-CM

## 2020-04-18 RX ORDER — BUPROPION HYDROCHLORIDE 150 MG/1
TABLET ORAL
Status: ON HOLD | COMMUNITY
Start: 2020-03-23 | End: 2022-01-11 | Stop reason: HOSPADM

## 2020-04-18 RX ORDER — MIRTAZAPINE 15 MG/1
TABLET, FILM COATED ORAL
Status: ON HOLD | COMMUNITY
Start: 2020-02-03 | End: 2022-01-11 | Stop reason: HOSPADM

## 2020-04-18 NOTE — PROGRESS NOTES
"Subjective:       Patient ID: Faye Khalil is a 34 y.o. female.    Vitals:  height is 5' 2" (1.575 m) and weight is 72.6 kg (160 lb). Her tympanic temperature is 97 °F (36.1 °C). Her pulse is 128 (abnormal). Her respiration is 16 and oxygen saturation is 97%.     Chief Complaint: Knee Pain (R KNEE )    Knee Pain    The incident occurred 12 to 24 hours ago. The incident occurred at home. The injury mechanism was a fall. The pain is present in the right knee. The quality of the pain is described as aching. The pain is at a severity of 8/10. The pain is mild. The pain has been constant since onset. She reports no foreign bodies present. Nothing aggravates the symptoms. She has tried nothing for the symptoms.       Constitution: Negative for chills, fatigue and fever.   HENT: Negative for congestion and sore throat.    Neck: Negative for painful lymph nodes.   Cardiovascular: Negative for chest pain and leg swelling.   Eyes: Negative for double vision and blurred vision.   Respiratory: Negative for cough and shortness of breath.    Gastrointestinal: Negative for nausea, vomiting and diarrhea.   Genitourinary: Negative for dysuria, frequency, urgency and history of kidney stones.   Musculoskeletal: Positive for pain and trauma. Negative for joint pain, joint swelling, muscle cramps and muscle ache.        R KNEE   Skin: Negative for color change, pale, rash and bruising.   Allergic/Immunologic: Negative for seasonal allergies.   Neurological: Negative for dizziness, history of vertigo, light-headedness, passing out and headaches.   Hematologic/Lymphatic: Negative for swollen lymph nodes.   Psychiatric/Behavioral: Negative for nervous/anxious, sleep disturbance and depression. The patient is not nervous/anxious.        Objective:      Physical Exam patient left before evaluation      Assessment:       1. Patient left without being seen        Plan:         Patient left without being seen           This encounter " was created in error - please disregard.

## 2021-02-06 ENCOUNTER — HOSPITAL ENCOUNTER (EMERGENCY)
Facility: HOSPITAL | Age: 36
Discharge: PSYCHIATRIC OR SUBSTANCE USE FACILITY | End: 2021-02-07
Attending: EMERGENCY MEDICINE
Payer: MEDICARE

## 2021-02-06 VITALS
TEMPERATURE: 98 F | DIASTOLIC BLOOD PRESSURE: 50 MMHG | WEIGHT: 160 LBS | OXYGEN SATURATION: 100 % | RESPIRATION RATE: 18 BRPM | HEART RATE: 84 BPM | BODY MASS INDEX: 29.26 KG/M2 | SYSTOLIC BLOOD PRESSURE: 102 MMHG

## 2021-02-06 DIAGNOSIS — F29 PSYCHOSIS, UNSPECIFIED PSYCHOSIS TYPE: Primary | ICD-10-CM

## 2021-02-06 LAB
ALBUMIN SERPL BCP-MCNC: 4.1 G/DL (ref 3.5–5.2)
ALP SERPL-CCNC: 65 U/L (ref 55–135)
ALT SERPL W/O P-5'-P-CCNC: 13 U/L (ref 10–44)
AMPHET+METHAMPHET UR QL: NEGATIVE
ANION GAP SERPL CALC-SCNC: 9 MMOL/L (ref 8–16)
APAP SERPL-MCNC: <3 UG/ML (ref 10–20)
AST SERPL-CCNC: 17 U/L (ref 10–40)
B-HCG UR QL: NEGATIVE
BACTERIA #/AREA URNS AUTO: ABNORMAL /HPF
BARBITURATES UR QL SCN>200 NG/ML: NEGATIVE
BASOPHILS # BLD AUTO: 0.04 K/UL (ref 0–0.2)
BASOPHILS NFR BLD: 0.5 % (ref 0–1.9)
BENZODIAZ UR QL SCN>200 NG/ML: NEGATIVE
BILIRUB SERPL-MCNC: 0.3 MG/DL (ref 0.1–1)
BILIRUB UR QL STRIP: NEGATIVE
BUN SERPL-MCNC: 9 MG/DL (ref 6–20)
BZE UR QL SCN: NEGATIVE
CALCIUM SERPL-MCNC: 9.2 MG/DL (ref 8.7–10.5)
CANNABINOIDS UR QL SCN: NEGATIVE
CHLORIDE SERPL-SCNC: 108 MMOL/L (ref 95–110)
CLARITY UR REFRACT.AUTO: CLEAR
CO2 SERPL-SCNC: 26 MMOL/L (ref 23–29)
COLOR UR AUTO: YELLOW
CREAT SERPL-MCNC: 0.8 MG/DL (ref 0.5–1.4)
CREAT UR-MCNC: 339 MG/DL (ref 15–325)
CTP QC/QA: YES
DIFFERENTIAL METHOD: ABNORMAL
EOSINOPHIL # BLD AUTO: 0.1 K/UL (ref 0–0.5)
EOSINOPHIL NFR BLD: 0.8 % (ref 0–8)
ERYTHROCYTE [DISTWIDTH] IN BLOOD BY AUTOMATED COUNT: 13.2 % (ref 11.5–14.5)
EST. GFR  (AFRICAN AMERICAN): >60 ML/MIN/1.73 M^2
EST. GFR  (NON AFRICAN AMERICAN): >60 ML/MIN/1.73 M^2
ETHANOL SERPL-MCNC: <10 MG/DL
GLUCOSE SERPL-MCNC: 93 MG/DL (ref 70–110)
GLUCOSE UR QL STRIP: NEGATIVE
HCT VFR BLD AUTO: 35.2 % (ref 37–48.5)
HGB BLD-MCNC: 10.7 G/DL (ref 12–16)
HGB UR QL STRIP: NEGATIVE
HYALINE CASTS UR QL AUTO: 3 /LPF
IMM GRANULOCYTES # BLD AUTO: 0.02 K/UL (ref 0–0.04)
IMM GRANULOCYTES NFR BLD AUTO: 0.2 % (ref 0–0.5)
KETONES UR QL STRIP: NEGATIVE
LEUKOCYTE ESTERASE UR QL STRIP: NEGATIVE
LYMPHOCYTES # BLD AUTO: 2.1 K/UL (ref 1–4.8)
LYMPHOCYTES NFR BLD: 25.5 % (ref 18–48)
MCH RBC QN AUTO: 25.5 PG (ref 27–31)
MCHC RBC AUTO-ENTMCNC: 30.4 G/DL (ref 32–36)
MCV RBC AUTO: 84 FL (ref 82–98)
METHADONE UR QL SCN>300 NG/ML: NEGATIVE
MICROSCOPIC COMMENT: ABNORMAL
MONOCYTES # BLD AUTO: 0.9 K/UL (ref 0.3–1)
MONOCYTES NFR BLD: 11.2 % (ref 4–15)
NEUTROPHILS # BLD AUTO: 5.1 K/UL (ref 1.8–7.7)
NEUTROPHILS NFR BLD: 61.8 % (ref 38–73)
NITRITE UR QL STRIP: NEGATIVE
NRBC BLD-RTO: 0 /100 WBC
OPIATES UR QL SCN: NEGATIVE
PCP UR QL SCN>25 NG/ML: NEGATIVE
PH UR STRIP: 6 [PH] (ref 5–8)
PLATELET # BLD AUTO: 388 K/UL (ref 150–350)
PMV BLD AUTO: 13 FL (ref 9.2–12.9)
POTASSIUM SERPL-SCNC: 3.4 MMOL/L (ref 3.5–5.1)
PROT SERPL-MCNC: 7.8 G/DL (ref 6–8.4)
PROT UR QL STRIP: ABNORMAL
RBC # BLD AUTO: 4.19 M/UL (ref 4–5.4)
RBC #/AREA URNS AUTO: 4 /HPF (ref 0–4)
SARS-COV-2 RDRP RESP QL NAA+PROBE: NEGATIVE
SODIUM SERPL-SCNC: 143 MMOL/L (ref 136–145)
SP GR UR STRIP: 1.02 (ref 1–1.03)
SQUAMOUS #/AREA URNS AUTO: 1 /HPF
TOXICOLOGY INFORMATION: ABNORMAL
TSH SERPL DL<=0.005 MIU/L-ACNC: 1.95 UIU/ML (ref 0.4–4)
URN SPEC COLLECT METH UR: ABNORMAL
WBC # BLD AUTO: 8.29 K/UL (ref 3.9–12.7)
WBC #/AREA URNS AUTO: 1 /HPF (ref 0–5)

## 2021-02-06 PROCEDURE — 80053 COMPREHEN METABOLIC PANEL: CPT

## 2021-02-06 PROCEDURE — 25000003 PHARM REV CODE 250: Performed by: PHYSICIAN ASSISTANT

## 2021-02-06 PROCEDURE — 84443 ASSAY THYROID STIM HORMONE: CPT

## 2021-02-06 PROCEDURE — 81001 URINALYSIS AUTO W/SCOPE: CPT

## 2021-02-06 PROCEDURE — 63600175 PHARM REV CODE 636 W HCPCS

## 2021-02-06 PROCEDURE — 99291 PR CRITICAL CARE, E/M 30-74 MINUTES: ICD-10-PCS | Mod: CS,,, | Performed by: EMERGENCY MEDICINE

## 2021-02-06 PROCEDURE — 99285 EMERGENCY DEPT VISIT HI MDM: CPT | Mod: 25

## 2021-02-06 PROCEDURE — 80307 DRUG TEST PRSMV CHEM ANLYZR: CPT

## 2021-02-06 PROCEDURE — 99291 CRITICAL CARE FIRST HOUR: CPT | Mod: CS,,, | Performed by: EMERGENCY MEDICINE

## 2021-02-06 PROCEDURE — 96372 THER/PROPH/DIAG INJ SC/IM: CPT

## 2021-02-06 PROCEDURE — S0166 INJ OLANZAPINE 2.5MG: HCPCS | Performed by: PHYSICIAN ASSISTANT

## 2021-02-06 PROCEDURE — U0002 COVID-19 LAB TEST NON-CDC: HCPCS | Performed by: PHYSICIAN ASSISTANT

## 2021-02-06 PROCEDURE — 85025 COMPLETE CBC W/AUTO DIFF WBC: CPT

## 2021-02-06 PROCEDURE — 81025 URINE PREGNANCY TEST: CPT

## 2021-02-06 PROCEDURE — 80143 DRUG ASSAY ACETAMINOPHEN: CPT

## 2021-02-06 PROCEDURE — 82077 ASSAY SPEC XCP UR&BREATH IA: CPT

## 2021-02-06 RX ORDER — LORAZEPAM 2 MG/ML
1 INJECTION INTRAMUSCULAR
Status: COMPLETED | OUTPATIENT
Start: 2021-02-06 | End: 2021-02-06

## 2021-02-06 RX ORDER — OLANZAPINE 10 MG/2ML
10 INJECTION, POWDER, FOR SOLUTION INTRAMUSCULAR ONCE AS NEEDED
Status: COMPLETED | OUTPATIENT
Start: 2021-02-06 | End: 2021-02-06

## 2021-02-06 RX ORDER — LORAZEPAM 2 MG/ML
1 INJECTION INTRAMUSCULAR ONCE AS NEEDED
Status: DISCONTINUED | OUTPATIENT
Start: 2021-02-06 | End: 2021-02-07 | Stop reason: HOSPADM

## 2021-02-06 RX ORDER — DIPHENHYDRAMINE HYDROCHLORIDE 50 MG/ML
25 INJECTION INTRAMUSCULAR; INTRAVENOUS ONCE AS NEEDED
Status: DISCONTINUED | OUTPATIENT
Start: 2021-02-06 | End: 2021-02-07 | Stop reason: HOSPADM

## 2021-02-06 RX ORDER — LORAZEPAM 2 MG/ML
INJECTION INTRAMUSCULAR
Status: COMPLETED
Start: 2021-02-06 | End: 2021-02-06

## 2021-02-06 RX ADMIN — OLANZAPINE 10 MG: 10 INJECTION, POWDER, LYOPHILIZED, FOR SOLUTION INTRAMUSCULAR at 09:02

## 2021-02-06 RX ADMIN — LORAZEPAM 1 MG: 2 INJECTION INTRAMUSCULAR; INTRAVENOUS at 09:02

## 2021-02-06 RX ADMIN — LORAZEPAM 1 MG: 2 INJECTION INTRAMUSCULAR at 09:02

## 2021-03-28 ENCOUNTER — CLINICAL SUPPORT (OUTPATIENT)
Dept: URGENT CARE | Facility: CLINIC | Age: 36
End: 2021-03-28
Payer: MEDICARE

## 2021-03-28 DIAGNOSIS — Z11.52 ENCOUNTER FOR SCREENING LABORATORY TESTING FOR COVID-19 VIRUS: Primary | ICD-10-CM

## 2021-03-28 LAB
CTP QC/QA: YES
SARS-COV-2 RDRP RESP QL NAA+PROBE: NEGATIVE

## 2021-03-28 PROCEDURE — U0002 COVID-19 LAB TEST NON-CDC: HCPCS | Mod: QW,S$GLB,, | Performed by: NURSE PRACTITIONER

## 2021-03-28 PROCEDURE — U0002: ICD-10-PCS | Mod: QW,S$GLB,, | Performed by: NURSE PRACTITIONER

## 2021-06-08 ENCOUNTER — OFFICE VISIT (OUTPATIENT)
Dept: INTERNAL MEDICINE | Facility: CLINIC | Age: 36
End: 2021-06-08
Payer: MEDICARE

## 2021-06-08 ENCOUNTER — LAB VISIT (OUTPATIENT)
Dept: LAB | Facility: HOSPITAL | Age: 36
End: 2021-06-08
Attending: NURSE PRACTITIONER
Payer: MEDICARE

## 2021-06-08 VITALS
SYSTOLIC BLOOD PRESSURE: 122 MMHG | BODY MASS INDEX: 27.95 KG/M2 | DIASTOLIC BLOOD PRESSURE: 78 MMHG | HEIGHT: 62 IN | HEART RATE: 72 BPM | WEIGHT: 151.88 LBS

## 2021-06-08 DIAGNOSIS — F20.9 SCHIZOPHRENIA, UNSPECIFIED TYPE: ICD-10-CM

## 2021-06-08 DIAGNOSIS — Z00.00 ROUTINE ADULT HEALTH MAINTENANCE: ICD-10-CM

## 2021-06-08 DIAGNOSIS — Z11.3 ROUTINE SCREENING FOR STI (SEXUALLY TRANSMITTED INFECTION): Primary | ICD-10-CM

## 2021-06-08 DIAGNOSIS — Z11.3 ROUTINE SCREENING FOR STI (SEXUALLY TRANSMITTED INFECTION): ICD-10-CM

## 2021-06-08 PROCEDURE — 86703 HIV-1/HIV-2 1 RESULT ANTBDY: CPT | Performed by: STUDENT IN AN ORGANIZED HEALTH CARE EDUCATION/TRAINING PROGRAM

## 2021-06-08 PROCEDURE — 36415 COLL VENOUS BLD VENIPUNCTURE: CPT | Performed by: STUDENT IN AN ORGANIZED HEALTH CARE EDUCATION/TRAINING PROGRAM

## 2021-06-08 PROCEDURE — 87491 CHLMYD TRACH DNA AMP PROBE: CPT | Performed by: STUDENT IN AN ORGANIZED HEALTH CARE EDUCATION/TRAINING PROGRAM

## 2021-06-08 PROCEDURE — 86592 SYPHILIS TEST NON-TREP QUAL: CPT | Performed by: STUDENT IN AN ORGANIZED HEALTH CARE EDUCATION/TRAINING PROGRAM

## 2021-06-08 PROCEDURE — 99203 PR OFFICE/OUTPT VISIT, NEW, LEVL III, 30-44 MIN: ICD-10-PCS | Mod: GC,S$GLB,, | Performed by: STUDENT IN AN ORGANIZED HEALTH CARE EDUCATION/TRAINING PROGRAM

## 2021-06-08 PROCEDURE — 99999 PR PBB SHADOW E&M-EST. PATIENT-LVL III: ICD-10-PCS | Mod: PBBFAC,GC,, | Performed by: STUDENT IN AN ORGANIZED HEALTH CARE EDUCATION/TRAINING PROGRAM

## 2021-06-08 PROCEDURE — 99999 PR PBB SHADOW E&M-EST. PATIENT-LVL III: CPT | Mod: PBBFAC,GC,, | Performed by: STUDENT IN AN ORGANIZED HEALTH CARE EDUCATION/TRAINING PROGRAM

## 2021-06-08 PROCEDURE — 87591 N.GONORRHOEAE DNA AMP PROB: CPT | Performed by: STUDENT IN AN ORGANIZED HEALTH CARE EDUCATION/TRAINING PROGRAM

## 2021-06-08 PROCEDURE — 99203 OFFICE O/P NEW LOW 30 MIN: CPT | Mod: GC,S$GLB,, | Performed by: STUDENT IN AN ORGANIZED HEALTH CARE EDUCATION/TRAINING PROGRAM

## 2021-06-08 RX ORDER — FERROUS SULFATE 324(65)MG
324 TABLET, DELAYED RELEASE (ENTERIC COATED) ORAL DAILY
Status: ON HOLD | COMMUNITY
End: 2022-01-11 | Stop reason: HOSPADM

## 2021-06-09 LAB
HIV 1+2 AB+HIV1 P24 AG SERPL QL IA: NEGATIVE
RPR SER QL: NORMAL

## 2021-06-10 ENCOUNTER — TELEPHONE (OUTPATIENT)
Dept: INTERNAL MEDICINE | Facility: CLINIC | Age: 36
End: 2021-06-10

## 2021-06-10 LAB
C TRACH DNA SPEC QL NAA+PROBE: NOT DETECTED
N GONORRHOEA DNA SPEC QL NAA+PROBE: NOT DETECTED

## 2021-06-15 ENCOUNTER — TELEPHONE (OUTPATIENT)
Dept: INTERNAL MEDICINE | Facility: CLINIC | Age: 36
End: 2021-06-15

## 2021-06-16 ENCOUNTER — TELEPHONE (OUTPATIENT)
Dept: INTERNAL MEDICINE | Facility: CLINIC | Age: 36
End: 2021-06-16

## 2021-06-17 ENCOUNTER — TELEPHONE (OUTPATIENT)
Dept: INTERNAL MEDICINE | Facility: CLINIC | Age: 36
End: 2021-06-17

## 2021-06-18 ENCOUNTER — TELEPHONE (OUTPATIENT)
Dept: INTERNAL MEDICINE | Facility: CLINIC | Age: 36
End: 2021-06-18

## 2021-06-18 DIAGNOSIS — Z11.3 ROUTINE SCREENING FOR STI (SEXUALLY TRANSMITTED INFECTION): Primary | ICD-10-CM

## 2021-07-12 ENCOUNTER — OFFICE VISIT (OUTPATIENT)
Dept: INTERNAL MEDICINE | Facility: CLINIC | Age: 36
End: 2021-07-12
Payer: MEDICARE

## 2021-07-12 VITALS — WEIGHT: 135.13 LBS | HEIGHT: 62 IN | BODY MASS INDEX: 24.87 KG/M2

## 2021-07-12 DIAGNOSIS — Z53.21 PATIENT LEFT WITHOUT BEING SEEN: Primary | ICD-10-CM

## 2021-07-12 PROCEDURE — 99024 PR POST-OP FOLLOW-UP VISIT: ICD-10-PCS | Mod: GC,S$GLB,, | Performed by: STUDENT IN AN ORGANIZED HEALTH CARE EDUCATION/TRAINING PROGRAM

## 2021-07-12 PROCEDURE — 99024 POSTOP FOLLOW-UP VISIT: CPT | Mod: GC,S$GLB,, | Performed by: STUDENT IN AN ORGANIZED HEALTH CARE EDUCATION/TRAINING PROGRAM

## 2021-09-10 ENCOUNTER — TELEPHONE (OUTPATIENT)
Dept: INTERNAL MEDICINE | Facility: CLINIC | Age: 36
End: 2021-09-10

## 2021-09-13 ENCOUNTER — TELEPHONE (OUTPATIENT)
Dept: INTERNAL MEDICINE | Facility: CLINIC | Age: 36
End: 2021-09-13

## 2021-09-14 ENCOUNTER — DOCUMENTATION ONLY (OUTPATIENT)
Dept: HEPATOLOGY | Facility: HOSPITAL | Age: 36
End: 2021-09-14

## 2021-09-22 ENCOUNTER — DOCUMENTATION ONLY (OUTPATIENT)
Dept: HEPATOLOGY | Facility: HOSPITAL | Age: 36
End: 2021-09-22

## 2021-09-28 ENCOUNTER — LAB VISIT (OUTPATIENT)
Dept: LAB | Facility: HOSPITAL | Age: 36
End: 2021-09-28
Attending: INTERNAL MEDICINE
Payer: MEDICARE

## 2021-09-28 ENCOUNTER — OFFICE VISIT (OUTPATIENT)
Dept: INTERNAL MEDICINE | Facility: CLINIC | Age: 36
End: 2021-09-28
Payer: MEDICARE

## 2021-09-28 VITALS
WEIGHT: 136.69 LBS | DIASTOLIC BLOOD PRESSURE: 86 MMHG | HEIGHT: 62 IN | OXYGEN SATURATION: 95 % | SYSTOLIC BLOOD PRESSURE: 110 MMHG | HEART RATE: 111 BPM | BODY MASS INDEX: 25.15 KG/M2

## 2021-09-28 DIAGNOSIS — Z11.3 SCREENING FOR STD (SEXUALLY TRANSMITTED DISEASE): ICD-10-CM

## 2021-09-28 DIAGNOSIS — Z11.3 SCREENING FOR STD (SEXUALLY TRANSMITTED DISEASE): Primary | ICD-10-CM

## 2021-09-28 PROBLEM — U07.1 COVID-19 VIRUS DETECTED: Status: ACTIVE | Noted: 2021-04-10

## 2021-09-28 PROBLEM — U07.1 COVID-19 VIRUS DETECTED: Status: RESOLVED | Noted: 2021-04-10 | Resolved: 2021-09-28

## 2021-09-28 PROCEDURE — 36415 COLL VENOUS BLD VENIPUNCTURE: CPT | Performed by: INTERNAL MEDICINE

## 2021-09-28 PROCEDURE — 3008F PR BODY MASS INDEX (BMI) DOCUMENTED: ICD-10-PCS | Mod: CPTII,S$GLB,, | Performed by: INTERNAL MEDICINE

## 2021-09-28 PROCEDURE — 3008F BODY MASS INDEX DOCD: CPT | Mod: CPTII,S$GLB,, | Performed by: INTERNAL MEDICINE

## 2021-09-28 PROCEDURE — 3074F PR MOST RECENT SYSTOLIC BLOOD PRESSURE < 130 MM HG: ICD-10-PCS | Mod: CPTII,S$GLB,, | Performed by: INTERNAL MEDICINE

## 2021-09-28 PROCEDURE — 99999 PR PBB SHADOW E&M-EST. PATIENT-LVL III: ICD-10-PCS | Mod: PBBFAC,,, | Performed by: INTERNAL MEDICINE

## 2021-09-28 PROCEDURE — 3079F DIAST BP 80-89 MM HG: CPT | Mod: CPTII,S$GLB,, | Performed by: INTERNAL MEDICINE

## 2021-09-28 PROCEDURE — 99213 PR OFFICE/OUTPT VISIT, EST, LEVL III, 20-29 MIN: ICD-10-PCS | Mod: S$GLB,,, | Performed by: INTERNAL MEDICINE

## 2021-09-28 PROCEDURE — 3079F PR MOST RECENT DIASTOLIC BLOOD PRESSURE 80-89 MM HG: ICD-10-PCS | Mod: CPTII,S$GLB,, | Performed by: INTERNAL MEDICINE

## 2021-09-28 PROCEDURE — 99213 OFFICE O/P EST LOW 20 MIN: CPT | Mod: S$GLB,,, | Performed by: INTERNAL MEDICINE

## 2021-09-28 PROCEDURE — 87389 HIV-1 AG W/HIV-1&-2 AB AG IA: CPT | Performed by: INTERNAL MEDICINE

## 2021-09-28 PROCEDURE — 87491 CHLMYD TRACH DNA AMP PROBE: CPT | Performed by: INTERNAL MEDICINE

## 2021-09-28 PROCEDURE — 86695 HERPES SIMPLEX TYPE 1 TEST: CPT | Performed by: INTERNAL MEDICINE

## 2021-09-28 PROCEDURE — 99999 PR PBB SHADOW E&M-EST. PATIENT-LVL III: CPT | Mod: PBBFAC,,, | Performed by: INTERNAL MEDICINE

## 2021-09-28 PROCEDURE — 86592 SYPHILIS TEST NON-TREP QUAL: CPT | Performed by: INTERNAL MEDICINE

## 2021-09-28 PROCEDURE — 3074F SYST BP LT 130 MM HG: CPT | Mod: CPTII,S$GLB,, | Performed by: INTERNAL MEDICINE

## 2021-09-28 PROCEDURE — 87591 N.GONORRHOEAE DNA AMP PROB: CPT | Performed by: INTERNAL MEDICINE

## 2021-09-29 LAB
C TRACH DNA SPEC QL NAA+PROBE: NOT DETECTED
HIV 1+2 AB+HIV1 P24 AG SERPL QL IA: NEGATIVE
N GONORRHOEA DNA SPEC QL NAA+PROBE: NOT DETECTED
RPR SER QL: NORMAL

## 2021-10-04 LAB
HSV1 IGG SERPL QL IA: POSITIVE
HSV2 IGG SERPL QL IA: NEGATIVE

## 2021-10-05 ENCOUNTER — TELEPHONE (OUTPATIENT)
Dept: INTERNAL MEDICINE | Facility: CLINIC | Age: 36
End: 2021-10-05

## 2021-10-13 PROCEDURE — 99282 PR EMERGENCY DEPT VISIT,LEVEL II: ICD-10-PCS | Mod: ,,, | Performed by: EMERGENCY MEDICINE

## 2021-10-13 PROCEDURE — 99282 EMERGENCY DEPT VISIT SF MDM: CPT | Mod: ,,, | Performed by: EMERGENCY MEDICINE

## 2021-10-13 PROCEDURE — 99283 EMERGENCY DEPT VISIT LOW MDM: CPT

## 2021-10-14 ENCOUNTER — HOSPITAL ENCOUNTER (EMERGENCY)
Facility: HOSPITAL | Age: 36
Discharge: HOME | End: 2021-10-14
Attending: EMERGENCY MEDICINE
Payer: MEDICARE

## 2021-10-14 VITALS
WEIGHT: 145 LBS | DIASTOLIC BLOOD PRESSURE: 58 MMHG | OXYGEN SATURATION: 99 % | HEART RATE: 92 BPM | SYSTOLIC BLOOD PRESSURE: 116 MMHG | BODY MASS INDEX: 26.68 KG/M2 | RESPIRATION RATE: 18 BRPM | TEMPERATURE: 99 F | HEIGHT: 62 IN

## 2021-10-14 DIAGNOSIS — M79.673 FOOT PAIN: ICD-10-CM

## 2021-10-20 ENCOUNTER — TELEPHONE (OUTPATIENT)
Dept: INTERNAL MEDICINE | Facility: CLINIC | Age: 36
End: 2021-10-20

## 2021-12-29 ENCOUNTER — HOSPITAL ENCOUNTER (EMERGENCY)
Facility: HOSPITAL | Age: 36
Discharge: PSYCHIATRIC OR SUBSTANCE USE FACILITY | End: 2021-12-29
Attending: EMERGENCY MEDICINE
Payer: MEDICARE

## 2021-12-29 VITALS
DIASTOLIC BLOOD PRESSURE: 64 MMHG | BODY MASS INDEX: 26.52 KG/M2 | WEIGHT: 145 LBS | TEMPERATURE: 99 F | RESPIRATION RATE: 16 BRPM | SYSTOLIC BLOOD PRESSURE: 121 MMHG | OXYGEN SATURATION: 99 % | HEART RATE: 80 BPM

## 2021-12-29 DIAGNOSIS — F29 ACUTE EXACERBATION OF PSYCHOSIS: Primary | ICD-10-CM

## 2021-12-29 LAB
ALBUMIN SERPL BCP-MCNC: 3.9 G/DL (ref 3.5–5.2)
ALP SERPL-CCNC: 46 U/L (ref 55–135)
ALT SERPL W/O P-5'-P-CCNC: 12 U/L (ref 10–44)
AMPHET+METHAMPHET UR QL: NEGATIVE
ANION GAP SERPL CALC-SCNC: 8 MMOL/L (ref 8–16)
APAP SERPL-MCNC: <3 UG/ML (ref 10–20)
AST SERPL-CCNC: 15 U/L (ref 10–40)
B-HCG UR QL: NEGATIVE
BACTERIA #/AREA URNS AUTO: NORMAL /HPF
BARBITURATES UR QL SCN>200 NG/ML: NEGATIVE
BASOPHILS # BLD AUTO: 0.02 K/UL (ref 0–0.2)
BASOPHILS NFR BLD: 0.4 % (ref 0–1.9)
BENZODIAZ UR QL SCN>200 NG/ML: NEGATIVE
BILIRUB SERPL-MCNC: 0.5 MG/DL (ref 0.1–1)
BILIRUB UR QL STRIP: NEGATIVE
BUN SERPL-MCNC: 16 MG/DL (ref 6–20)
BZE UR QL SCN: NEGATIVE
CALCIUM SERPL-MCNC: 9.1 MG/DL (ref 8.7–10.5)
CANNABINOIDS UR QL SCN: NEGATIVE
CHLORIDE SERPL-SCNC: 106 MMOL/L (ref 95–110)
CLARITY UR REFRACT.AUTO: CLEAR
CO2 SERPL-SCNC: 24 MMOL/L (ref 23–29)
COLOR UR AUTO: YELLOW
CREAT SERPL-MCNC: 0.6 MG/DL (ref 0.5–1.4)
CREAT UR-MCNC: 104 MG/DL (ref 15–325)
CTP QC/QA: YES
CTP QC/QA: YES
DIFFERENTIAL METHOD: ABNORMAL
EOSINOPHIL # BLD AUTO: 0 K/UL (ref 0–0.5)
EOSINOPHIL NFR BLD: 0.2 % (ref 0–8)
ERYTHROCYTE [DISTWIDTH] IN BLOOD BY AUTOMATED COUNT: 15.7 % (ref 11.5–14.5)
EST. GFR  (AFRICAN AMERICAN): >60 ML/MIN/1.73 M^2
EST. GFR  (NON AFRICAN AMERICAN): >60 ML/MIN/1.73 M^2
ETHANOL SERPL-MCNC: <10 MG/DL
GLUCOSE SERPL-MCNC: 80 MG/DL (ref 70–110)
GLUCOSE UR QL STRIP: NEGATIVE
HCT VFR BLD AUTO: 32.7 % (ref 37–48.5)
HGB BLD-MCNC: 10.5 G/DL (ref 12–16)
HGB UR QL STRIP: NEGATIVE
IMM GRANULOCYTES # BLD AUTO: 0.01 K/UL (ref 0–0.04)
IMM GRANULOCYTES NFR BLD AUTO: 0.2 % (ref 0–0.5)
KETONES UR QL STRIP: NEGATIVE
LEUKOCYTE ESTERASE UR QL STRIP: ABNORMAL
LYMPHOCYTES # BLD AUTO: 1.7 K/UL (ref 1–4.8)
LYMPHOCYTES NFR BLD: 35.1 % (ref 18–48)
MCH RBC QN AUTO: 26.1 PG (ref 27–31)
MCHC RBC AUTO-ENTMCNC: 32.1 G/DL (ref 32–36)
MCV RBC AUTO: 81 FL (ref 82–98)
METHADONE UR QL SCN>300 NG/ML: NEGATIVE
MICROSCOPIC COMMENT: NORMAL
MONOCYTES # BLD AUTO: 0.4 K/UL (ref 0.3–1)
MONOCYTES NFR BLD: 7.6 % (ref 4–15)
NEUTROPHILS # BLD AUTO: 2.7 K/UL (ref 1.8–7.7)
NEUTROPHILS NFR BLD: 56.5 % (ref 38–73)
NITRITE UR QL STRIP: NEGATIVE
NRBC BLD-RTO: 0 /100 WBC
OPIATES UR QL SCN: NEGATIVE
PCP UR QL SCN>25 NG/ML: NEGATIVE
PH UR STRIP: 5 [PH] (ref 5–8)
PLATELET # BLD AUTO: 395 K/UL (ref 150–450)
PMV BLD AUTO: 10.8 FL (ref 9.2–12.9)
POTASSIUM SERPL-SCNC: 3.6 MMOL/L (ref 3.5–5.1)
PROT SERPL-MCNC: 7.3 G/DL (ref 6–8.4)
PROT UR QL STRIP: NEGATIVE
RBC # BLD AUTO: 4.03 M/UL (ref 4–5.4)
RBC #/AREA URNS AUTO: 1 /HPF (ref 0–4)
SARS-COV-2 RDRP RESP QL NAA+PROBE: NEGATIVE
SODIUM SERPL-SCNC: 138 MMOL/L (ref 136–145)
SP GR UR STRIP: 1.02 (ref 1–1.03)
SQUAMOUS #/AREA URNS AUTO: 5 /HPF
TOXICOLOGY INFORMATION: NORMAL
TSH SERPL DL<=0.005 MIU/L-ACNC: 1.05 UIU/ML (ref 0.4–4)
URN SPEC COLLECT METH UR: ABNORMAL
WBC # BLD AUTO: 4.85 K/UL (ref 3.9–12.7)
WBC #/AREA URNS AUTO: 4 /HPF (ref 0–5)

## 2021-12-29 PROCEDURE — 80053 COMPREHEN METABOLIC PANEL: CPT | Performed by: EMERGENCY MEDICINE

## 2021-12-29 PROCEDURE — 84443 ASSAY THYROID STIM HORMONE: CPT | Performed by: EMERGENCY MEDICINE

## 2021-12-29 PROCEDURE — 90792 PSYCH DIAG EVAL W/MED SRVCS: CPT | Mod: ,,, | Performed by: PSYCHIATRY & NEUROLOGY

## 2021-12-29 PROCEDURE — 99284 EMERGENCY DEPT VISIT MOD MDM: CPT | Mod: CS,,, | Performed by: EMERGENCY MEDICINE

## 2021-12-29 PROCEDURE — 90792 PR PSYCHIATRIC DIAGNOSTIC EVALUATION W/MEDICAL SERVICES: ICD-10-PCS | Mod: ,,, | Performed by: PSYCHIATRY & NEUROLOGY

## 2021-12-29 PROCEDURE — 81025 URINE PREGNANCY TEST: CPT | Performed by: EMERGENCY MEDICINE

## 2021-12-29 PROCEDURE — 81001 URINALYSIS AUTO W/SCOPE: CPT | Mod: 59 | Performed by: EMERGENCY MEDICINE

## 2021-12-29 PROCEDURE — 99285 EMERGENCY DEPT VISIT HI MDM: CPT | Mod: 25

## 2021-12-29 PROCEDURE — 82077 ASSAY SPEC XCP UR&BREATH IA: CPT | Performed by: EMERGENCY MEDICINE

## 2021-12-29 PROCEDURE — 85025 COMPLETE CBC W/AUTO DIFF WBC: CPT | Performed by: EMERGENCY MEDICINE

## 2021-12-29 PROCEDURE — U0002 COVID-19 LAB TEST NON-CDC: HCPCS | Performed by: EMERGENCY MEDICINE

## 2021-12-29 PROCEDURE — 80307 DRUG TEST PRSMV CHEM ANLYZR: CPT | Performed by: EMERGENCY MEDICINE

## 2021-12-29 PROCEDURE — 80143 DRUG ASSAY ACETAMINOPHEN: CPT | Performed by: EMERGENCY MEDICINE

## 2021-12-29 PROCEDURE — 96372 THER/PROPH/DIAG INJ SC/IM: CPT

## 2021-12-29 PROCEDURE — 63600175 PHARM REV CODE 636 W HCPCS: Performed by: EMERGENCY MEDICINE

## 2021-12-29 PROCEDURE — 99284 PR EMERGENCY DEPT VISIT,LEVEL IV: ICD-10-PCS | Mod: CS,,, | Performed by: EMERGENCY MEDICINE

## 2021-12-29 RX ORDER — HALOPERIDOL 5 MG/ML
5 INJECTION INTRAMUSCULAR
Status: COMPLETED | OUTPATIENT
Start: 2021-12-29 | End: 2021-12-29

## 2021-12-29 RX ORDER — LORAZEPAM 2 MG/ML
2 INJECTION INTRAMUSCULAR
Status: COMPLETED | OUTPATIENT
Start: 2021-12-29 | End: 2021-12-29

## 2021-12-29 RX ADMIN — HALOPERIDOL LACTATE 5 MG: 5 INJECTION, SOLUTION INTRAMUSCULAR at 03:12

## 2021-12-29 RX ADMIN — LORAZEPAM 2 MG: 2 INJECTION INTRAMUSCULAR; INTRAVENOUS at 03:12

## 2021-12-29 NOTE — ASSESSMENT & PLAN NOTE
ASSESSMENT     Faye Khalil is a 36 y.o. female with a past psychiatric history of schizophrenia, currently presenting with bizarre behavior in the setting of medication non aderhence.  Emergency Psychiatry was originally consulted to address the patient's symptoms of bizarre behavior.     IMPRESSION  Schizophrenia  Medication non-adherence     RECOMMENDATION(S)      1. Scheduled Medication(s):  risperdal 1 mg PO BID    2. PRN Medication(s):  Haldol 5 mg every 8 hours as needed for non redirectable agitation due to breakthrough psychosis or xochilt    Ativan 2 mg every 8 hours as needed for non redirectable agitation due to breakthrough psychosis or xochilt      3. Legal Status/Precaution(s):  Continue PEC at this time as patient is gravely disabled secondary to acute psychiatric illness. Please seek inpatient psychiatric admission once medically cleared.    In cases of emergency, daily coverage provided by Acute/ED Psych MD, NP, or SW, with associated contact numbers listed in the [facility] On Call Schedule.    Case discussed with emergency psychiatry staff: Dr. Samantha Baez MD  Psychiatry  [facility]

## 2021-12-29 NOTE — ED NOTES
CODE WATCH called to room 7, personal items and clothes removed from patient, security present at bedside, pt placed in paper scrubs-- sitter present at doorway

## 2021-12-29 NOTE — ED NOTES
Pt very anxious and agitated, pt refused to cooperate. Pt speech is rambling and tangential. Pt given IM Haldol and Ativan without resistance.

## 2021-12-29 NOTE — ED NOTES
Pt upset and yelling. Pt making verbal threats to staff. Pt got up and ran from the room, panic button pressured. Pt caught and returned to her room.
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
## 2021-12-29 NOTE — ED PROVIDER NOTES
Patient was signed out to me by Dr. Zhao. Briefly, this is a 35yo F with PMH schizophrenia presenting with acute bizarre behavior.    PE:   Const: Awake and alert, NAD  Resp: Even and unlabored  Neuro: Moves all extremities equally  Psych: pressured speech, agitated, intermittently yelling    Data:  Results for orders placed or performed during the hospital encounter of 12/29/21   CBC auto differential   Result Value Ref Range    WBC 4.85 3.90 - 12.70 K/uL    RBC 4.03 4.00 - 5.40 M/uL    Hemoglobin 10.5 (L) 12.0 - 16.0 g/dL    Hematocrit 32.7 (L) 37.0 - 48.5 %    MCV 81 (L) 82 - 98 fL    MCH 26.1 (L) 27.0 - 31.0 pg    MCHC 32.1 32.0 - 36.0 g/dL    RDW 15.7 (H) 11.5 - 14.5 %    Platelets 395 150 - 450 K/uL    MPV 10.8 9.2 - 12.9 fL    Immature Granulocytes 0.2 0.0 - 0.5 %    Gran # (ANC) 2.7 1.8 - 7.7 K/uL    Immature Grans (Abs) 0.01 0.00 - 0.04 K/uL    Lymph # 1.7 1.0 - 4.8 K/uL    Mono # 0.4 0.3 - 1.0 K/uL    Eos # 0.0 0.0 - 0.5 K/uL    Baso # 0.02 0.00 - 0.20 K/uL    nRBC 0 0 /100 WBC    Gran % 56.5 38.0 - 73.0 %    Lymph % 35.1 18.0 - 48.0 %    Mono % 7.6 4.0 - 15.0 %    Eosinophil % 0.2 0.0 - 8.0 %    Basophil % 0.4 0.0 - 1.9 %    Differential Method Automated    Comprehensive metabolic panel   Result Value Ref Range    Sodium 138 136 - 145 mmol/L    Potassium 3.6 3.5 - 5.1 mmol/L    Chloride 106 95 - 110 mmol/L    CO2 24 23 - 29 mmol/L    Glucose 80 70 - 110 mg/dL    BUN 16 6 - 20 mg/dL    Creatinine 0.6 0.5 - 1.4 mg/dL    Calcium 9.1 8.7 - 10.5 mg/dL    Total Protein 7.3 6.0 - 8.4 g/dL    Albumin 3.9 3.5 - 5.2 g/dL    Total Bilirubin 0.5 0.1 - 1.0 mg/dL    Alkaline Phosphatase 46 (L) 55 - 135 U/L    AST 15 10 - 40 U/L    ALT 12 10 - 44 U/L    Anion Gap 8 8 - 16 mmol/L    eGFR if African American >60.0 >60 mL/min/1.73 m^2    eGFR if non African American >60.0 >60 mL/min/1.73 m^2   TSH   Result Value Ref Range    TSH 1.047 0.400 - 4.000 uIU/mL   Urinalysis, Reflex to Urine Culture Urine, Clean Catch     Specimen: Urine   Result Value Ref Range    Specimen UA Urine, Clean Catch     Color, UA Yellow Yellow, Straw, Florencia    Appearance, UA Clear Clear    pH, UA 5.0 5.0 - 8.0    Specific Gravity, UA 1.020 1.005 - 1.030    Protein, UA Negative Negative    Glucose, UA Negative Negative    Ketones, UA Negative Negative    Bilirubin (UA) Negative Negative    Occult Blood UA Negative Negative    Nitrite, UA Negative Negative    Leukocytes, UA 1+ (A) Negative   Drug screen panel, emergency   Result Value Ref Range    Benzodiazepines Negative Negative    Methadone metabolites Negative Negative    Cocaine (Metab.) Negative Negative    Opiate Scrn, Ur Negative Negative    Barbiturate Screen, Ur Negative Negative    Amphetamine Screen, Ur Negative Negative    THC Negative Negative    Phencyclidine Negative Negative    Creatinine, Urine 104.0 15.0 - 325.0 mg/dL    Toxicology Information SEE COMMENT    Ethanol   Result Value Ref Range    Alcohol, Serum <10 <10 mg/dL   Acetaminophen level   Result Value Ref Range    Acetaminophen (Tylenol), Serum <3.0 (L) 10.0 - 20.0 ug/mL   Urinalysis Microscopic   Result Value Ref Range    RBC, UA 1 0 - 4 /hpf    WBC, UA 4 0 - 5 /hpf    Bacteria Rare None-Occ /hpf    Squam Epithel, UA 5 /hpf    Microscopic Comment SEE COMMENT    POCT COVID-19 Rapid Screening   Result Value Ref Range    POC Rapid COVID Negative Negative     Acceptable Yes    POCT urine pregnancy   Result Value Ref Range    POC Preg Test, Ur Negative Negative     Acceptable Yes       Imaging Results    None          MDM:   UA contaminated, doubt UTI.     Patient is exhibiting signs of severe psychiatric distress and agitated delirium yelling/pressured speech/pacing/ physically/verbally threating representing an immediate physical danger to self and others. We have attempted verbal de-escalation protocols, decreased stimulation, security standby and oral medications were offered/given without effect.  Parenteral medications were administered to the patient for chemical sedation. During their time in observation, they received the following/interventions of aspiration precautions, cardiopulmonary/pulse oximetry monitoring, one-to-one monitoring and neuro checks.    Patient given IM haldol/ativan per psychiatric recommendations.     Medically clear for further psych care, pending transfer.        Jaelyn Gross MD  12/29/21 0976

## 2021-12-29 NOTE — SUBJECTIVE & OBJECTIVE
Patient History           Medical as of 12/29/2021     Past Medical History     Diagnosis Date Comments Source    COVID-19 virus detected 4/10/2021 -- Provider    Depression -- -- Provider    Microcytic anemia 4/18/2019 -- Provider    S/P tubal ligation 5/8/2014 -- Provider    Schizophrenia, chronic condition 8/6/2014 -- Provider                  Surgical as of 12/29/2021     Past Surgical History     Procedure Laterality Date Comments Source    TUBAL LIGATION -- -- -- Provider                  Family as of 12/29/2021     Problem Relation Name Age of Onset Comments Source    Breast cancer Maternal Aunt -- -- -- Provider    Cancer Maternal Uncle -- -- -- Provider    Ovarian cancer Neg Hx -- -- -- Provider    Hypertension Neg Hx -- -- -- Provider    Diabetes Neg Hx -- -- -- Provider            Tobacco Use as of 12/29/2021     Smoking Status Smoking Start Date Smoking Quit Date Packs/Day Years Used    Never Smoker -- -- -- --    Types Comments Smokeless Tobacco Status Smokeless Tobacco Quit Date Source    -- -- Unknown -- Provider            Alcohol Use as of 12/29/2021     Alcohol Use Drinks/Week Alcohol/Week Comments Source    No   -- -- Provider            Drug Use as of 12/29/2021     Drug Use Types Frequency Comments Source    Never -- -- -- Provider            Sexual Activity as of 12/29/2021     Sexually Active Birth Control Partners Comments Source    Never Surgical -- -- Provider            Activities of Daily Living as of 12/29/2021    None           Social Documentation as of 12/29/2021    None           Occupational as of 12/29/2021    None           Socioeconomic as of 12/29/2021     Marital Status Spouse Name Number of Children Years Education Education Level Preferred Language Ethnicity Race Source    Single -- -- -- -- English Not  or /a Black or  --            Pertinent History     Question Response Comments    Lives with -- --    Place in Birth Order -- --    Lives  in -- --    Number of Siblings -- --    Raised by -- --    Legal Involvement -- --    Childhood Trauma -- --    Criminal History of -- --    Financial Status -- --    Highest Level of Education -- --    Does patient have access to a firearm? -- --     Service -- --    Primary Leisure Activity -- --    Spirituality -- --        Past Medical History:   Diagnosis Date    COVID-19 virus detected 4/10/2021    Depression     Microcytic anemia 4/18/2019    S/P tubal ligation 5/8/2014    Schizophrenia, chronic condition 8/6/2014     Past Surgical History:   Procedure Laterality Date    TUBAL LIGATION       Family History     Problem Relation (Age of Onset)    Breast cancer Maternal Aunt    Cancer Maternal Uncle        Tobacco Use    Smoking status: Never Smoker    Smokeless tobacco: Not on file   Substance and Sexual Activity    Alcohol use: No    Drug use: Never    Sexual activity: Never     Birth control/protection: Surgical     Review of patient's allergies indicates:  No Known Allergies    No current facility-administered medications on file prior to encounter.     Current Outpatient Medications on File Prior to Encounter   Medication Sig    buPROPion (WELLBUTRIN XL) 150 MG TB24 tablet     ferrous sulfate 324 mg (65 mg iron) TbEC Take 324 mg by mouth once daily.    mirtazapine (REMERON) 15 MG tablet     paliperidone palmitate (INVEGA SUSTENNA) 156 mg/mL Syrg injection Inject 1 mL (156 mg total) into the muscle every 28 days.     Psychotherapeutics (From admission, onward)            None        Review of Systems   Constitutional: Positive for appetite change. Negative for activity change, fatigue and fever.   HENT: Negative for drooling, ear discharge, ear pain, sneezing and sore throat.    Eyes: Negative for pain and redness.   Respiratory: Negative for cough and shortness of breath.    Cardiovascular: Negative for chest pain.   Gastrointestinal: Negative for abdominal pain, nausea and vomiting.    Musculoskeletal: Negative for neck stiffness.   Skin: Negative for color change.   Neurological: Negative for dizziness, tremors, facial asymmetry and headaches.   Psychiatric/Behavioral: Positive for agitation and hallucinations. Negative for self-injury and suicidal ideas.     Strengths and Liabilities: Liability: Patient has poor judgment    Objective:     Vital Signs (Most Recent):  Temp: 98.8 °F (37.1 °C) (12/29/21 1156)  Pulse: (!) 129 (12/29/21 1156)  Resp: 18 (12/29/21 1156)  BP: 117/66 (12/29/21 1156)  SpO2: 99 % (12/29/21 1156) Vital Signs (24h Range):  Temp:  [98.8 °F (37.1 °C)] 98.8 °F (37.1 °C)  Pulse:  [129] 129  Resp:  [18] 18  SpO2:  [99 %] 99 %  BP: (117)/(66) 117/66        Weight: 65.8 kg (145 lb)  Body mass index is 26.52 kg/m².    No intake or output data in the 24 hours ending 12/29/21 1451    Physical Exam  Constitutional:       General: She is not in acute distress.     Appearance: She is not ill-appearing.   HENT:      Head: Normocephalic and atraumatic.      Nose: Nose normal.      Mouth/Throat:      Mouth: Mucous membranes are moist.      Pharynx: Oropharynx is clear.   Eyes:      General: No scleral icterus.        Right eye: No discharge.         Left eye: No discharge.      Extraocular Movements: Extraocular movements intact.   Pulmonary:      Effort: Pulmonary effort is normal.   Musculoskeletal:      Cervical back: Normal range of motion. No rigidity.   Skin:     General: Skin is dry.   Neurological:      Motor: No weakness.   Psychiatric:      Comments: Insight and judgement poor and impaired  Behavior abnormal          Significant Labs:   Last 24 Hours:   Recent Lab Results       12/29/21  1351   12/29/21  1349   12/29/21  1340   12/29/21  1339        Benzodiazepines   Negative           Methadone metabolites   Negative           Phencyclidine   Negative           Acetaminophen (Tylenol), Serum     <3.0  Comment: Toxic Levels:  Adults (4 hr post-ingestion).........>150  ug/mL  Adults (12 hr post-ingestion)........>40 ug/mL  Peds (2 hr post-ingestion, liquid)...>225 ug/mL           Albumin     3.9         Alcohol, Serum     <10         Alkaline Phosphatase     46         ALT     12         Amphetamine Screen, Ur   Negative           Anion Gap     8         Appearance, UA   Clear           AST     15         Bacteria, UA   Rare           Barbiturate Screen, Ur   Negative           Baso #     0.02         Basophil %     0.4         Bilirubin (UA)   Negative           BILIRUBIN TOTAL     0.5  Comment: For infants and newborns, interpretation of results should be based  on gestational age, weight and in agreement with clinical  observations.    Premature Infant recommended reference ranges:  Up to 24 hours.............<8.0 mg/dL  Up to 48 hours............<12.0 mg/dL  3-5 days..................<15.0 mg/dL  6-29 days.................<15.0 mg/dL           BUN     16         Calcium     9.1         Chloride     106         CO2     24         Cocaine (Metab.)   Negative           Color, UA   Yellow           Creatinine     0.6         Creatinine, Urine   104.0           Differential Method     Automated         eGFR if      >60.0         eGFR if non      >60.0  Comment: Calculation used to obtain the estimated glomerular filtration  rate (eGFR) is the CKD-EPI equation.            Eos #     0.0         Eosinophil %     0.2         Glucose     80         Glucose, UA   Negative           Gran # (ANC)     2.7         Gran %     56.5         Hematocrit     32.7         Hemoglobin     10.5         Immature Grans (Abs)     0.01  Comment: Mild elevation in immature granulocytes is non specific and   can be seen in a variety of conditions including stress response,   acute inflammation, trauma and pregnancy. Correlation with other   laboratory and clinical findings is essential.           Immature Granulocytes     0.2         Ketones, UA   Negative            Leukocytes, UA   1+           Lymph #     1.7         Lymph %     35.1         MCH     26.1         MCHC     32.1         MCV     81         Microscopic Comment   SEE COMMENT  Comment: Other formed elements not mentioned in the report are not   present in the microscopic examination.              Mono #     0.4         Mono %     7.6         MPV     10.8         NITRITE UA   Negative           nRBC     0         Occult Blood UA   Negative           Opiate Scrn, Ur   Negative           pH, UA   5.0           Platelets     395         Potassium     3.6         Preg Test, Ur Negative             PROTEIN TOTAL     7.3         Protein, UA   Negative  Comment: Recommend a 24 hour urine protein or a urine   protein/creatinine ratio if globulin induced proteinuria is  clinically suspected.              Acceptable Yes       Yes       RBC     4.03         RBC, UA   1           RDW     15.7         SARS-CoV-2 RNA, Amplification, Qual       Negative       Sodium     138         Specific Gravity, UA   1.020           Specimen UA   Urine, Clean Catch           Squam Epithel, UA   5           Marijuana (THC) Metabolite   Negative           Toxicology Information   SEE COMMENT  Comment: This screen includes the following classes of drugs at the listed   cut-off:    Benzodiazepines 200 ng/ml  Methadone 300 ng/ml  Cocaine metabolite 300 ng/ml  Opiates 300 ng/ml  Barbiturates 200 ng/ml  Amphetamines 1000 ng/ml  Marijuana metabs (THC) 50 ng/ml  Phencyclidine (PCP) 25 ng/ml    This is a screening test. If results do not correlate with clinical   presentation, then a confirmatory send out test is advised.     This report is intended for use in clinical monitoring and management   of   patients. It is not intended for use in employment related drug   testing.             TSH     1.047         WBC, UA   4           WBC     4.85               Significant Imaging: None
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
## 2021-12-29 NOTE — ED NOTES
Pt transferred to bed 27, she is anxious and yelling. Pt is not comprehending anything being said to her. Pt lying on the stretcher security at the bedside.

## 2021-12-30 PROBLEM — R46.2 BIZARRE BEHAVIOR: Status: ACTIVE | Noted: 2021-12-30

## 2021-12-30 PROBLEM — Z13.9 ENCOUNTER FOR MEDICAL SCREENING EXAMINATION: Status: ACTIVE | Noted: 2021-12-30

## 2021-12-30 NOTE — ED NOTES
Pt escorted off of the unit on a stretcher by ED and security staff. Pt's PEC, transfer form, and belongings given to [facility] staff. Pt remained calm and cooperative for the transfer.

## 2022-04-04 PROBLEM — Z13.9 ENCOUNTER FOR MEDICAL SCREENING EXAMINATION: Status: RESOLVED | Noted: 2021-12-30 | Resolved: 2022-04-04

## 2024-01-24 ENCOUNTER — OFFICE VISIT (OUTPATIENT)
Dept: URGENT CARE | Facility: CLINIC | Age: 39
End: 2024-01-24
Payer: MEDICARE

## 2024-01-24 VITALS
HEIGHT: 62 IN | SYSTOLIC BLOOD PRESSURE: 118 MMHG | TEMPERATURE: 99 F | OXYGEN SATURATION: 97 % | WEIGHT: 185.19 LBS | DIASTOLIC BLOOD PRESSURE: 82 MMHG | HEART RATE: 110 BPM | RESPIRATION RATE: 16 BRPM | BODY MASS INDEX: 34.08 KG/M2

## 2024-01-24 DIAGNOSIS — J06.9 VIRAL UPPER RESPIRATORY TRACT INFECTION WITH COUGH: Primary | ICD-10-CM

## 2024-01-24 LAB
CTP QC/QA: YES
SARS-COV-2 AG RESP QL IA.RAPID: NEGATIVE

## 2024-01-24 PROCEDURE — 87811 SARS-COV-2 COVID19 W/OPTIC: CPT | Mod: QW,S$GLB,, | Performed by: NURSE PRACTITIONER

## 2024-01-24 PROCEDURE — 99213 OFFICE O/P EST LOW 20 MIN: CPT | Mod: S$GLB,,, | Performed by: NURSE PRACTITIONER

## 2024-01-24 NOTE — PROGRESS NOTES
"Subjective:      Patient ID: Faye Khalil is a 38 y.o. female.    Vitals:  height is 5' 2" (1.575 m) and weight is 84 kg (185 lb 3 oz). Her oral temperature is 98.5 °F (36.9 °C). Her blood pressure is 118/82 and her pulse is 110. Her respiration is 16 and oxygen saturation is 97%.     Chief Complaint: Cough    This is a 38 y.o. female who presents today with a chief complaint of  cough, sneezing, and congestion sx started 5 days ago. denies fever, body aches or chills, denies  wheezing or shortness of breath, denies nausea, vomiting, diarrhea or abdominal pain, denies chest pain or dizziness positional lightheadedness, denies sore throat or trouble swallowing, denies loss of taste or smell, or any other symptoms       Cough  This is a new problem. The current episode started in the past 7 days. The problem has been gradually worsening. The problem occurs constantly. The cough is Non-productive. Pertinent negatives include no nasal congestion or postnasal drip. Nothing aggravates the symptoms. She has tried nothing for the symptoms. The treatment provided no relief.     HENT:  Negative for postnasal drip.    Respiratory:  Positive for cough.       Objective:     Physical Exam   Constitutional: She is oriented to person, place, and time. She appears well-developed. She is cooperative.  Non-toxic appearance. She does not appear ill. No distress.   HENT:   Head: Normocephalic and atraumatic.   Ears:   Right Ear: Hearing, tympanic membrane, external ear and ear canal normal.   Left Ear: Hearing, tympanic membrane, external ear and ear canal normal.   Nose: Nose normal. No mucosal edema, rhinorrhea or nasal deformity. No epistaxis. Right sinus exhibits no maxillary sinus tenderness and no frontal sinus tenderness. Left sinus exhibits no maxillary sinus tenderness and no frontal sinus tenderness.   Mouth/Throat: Uvula is midline, oropharynx is clear and moist and mucous membranes are normal. No trismus in the jaw. " Normal dentition. No uvula swelling. No oropharyngeal exudate, posterior oropharyngeal edema or posterior oropharyngeal erythema.   Eyes: Conjunctivae and lids are normal. No scleral icterus.   Neck: Trachea normal and phonation normal. Neck supple. No edema present. No erythema present. No neck rigidity present.   Cardiovascular: Normal rate, regular rhythm, normal heart sounds and normal pulses.   Pulmonary/Chest: Effort normal and breath sounds normal. No stridor. No respiratory distress. She has no decreased breath sounds. She has no wheezes. She has no rhonchi. She has no rales.   Abdominal: Normal appearance.   Musculoskeletal: Normal range of motion.         General: No deformity. Normal range of motion.   Neurological: She is alert and oriented to person, place, and time. She exhibits normal muscle tone. Coordination normal.   Skin: Skin is warm, dry, intact, not diaphoretic and not pale.   Psychiatric: Her speech is normal and behavior is normal. Judgment and thought content normal.   Nursing note and vitals reviewed.    Results for orders placed or performed in visit on 01/24/24   SARS Coronavirus 2 Antigen, POCT Manual Read   Result Value Ref Range    SARS Coronavirus 2 Antigen Negative Negative     Acceptable Yes          Patient in no acute distress.  Vitals reassuring.  Negative COVID 19 results discussed with patient in detail.  Over-the-counter medications discussed.Proper hydration advised.   Discussed results/diagnosis/plan in depth with patient in clinic. Strict precautions given to patient to monitor for worsening signs and symptoms. Advised to follow up with primary.All questions answered. Strict ER precautions given. If your symptoms worsens or fail to improve you should go to the Emergency Room. Discharge and follow-up instructions given verbally/printed. Discharge and follow-up instructions discussed with the patient who expressed understanding and willingness to comply with my  recommendations.Patient voiced understanding and in agreement with current treatment plan.     Please be advised this text was dictated with [employer] software and may contain errors due to translation.   Assessment:     1. Viral upper respiratory tract infection with cough        Plan:       Viral upper respiratory tract infection with cough  -     SARS Coronavirus 2 Antigen, POCT Manual Read                  Patient Instructions   PLEASE READ YOUR DISCHARGE INSTRUCTIONS ENTIRELY AS IT CONTAINS IMPORTANT INFORMATION.      Please drink plenty of fluids.    Please get plenty of rest.    Please return here or go to the Emergency Department for any concerns or worsening of condition.    Please take an over the counter antihistamine medication (allegra/Claritin/Zyrtec) of your choice as directed.    Try an over the counter decongestant like Mucinex D or Sudafed. You buy this behind the pharmacy counter    If you do have Hypertension or palpitations, it is safe to take Coricidin HBP for relief of sinus symptoms.    If not allergic, please take over the counter Tylenol (Acetaminophen) and/or Motrin (Ibuprofen) as directed for control of pain and/or fever.  Please follow up with your primary care doctor or specialist as needed.    Sore throat recommendations: Warm fluids, warm salt water gargles, throat lozenges, tea, honey, soup, rest, hydration.    Use over the counter flonase: one spray each nostril twice daily OR two sprays each nostril once daily.     If you  smoke, please stop smoking.      Please return or see your primary care doctor if you develop new or worsening symptoms.     Please arrange follow up with your primary medical clinic as soon as possible. You must understand that you've received an Urgent Care treatment only and that you may be released before all of your medical problems are known or treated. You, the patient, will arrange for follow up as instructed. If your symptoms worsen or fail to improve you  should go to the Emergency Room.

## 2024-07-09 NOTE — ED NOTES
Pt lying on the stretcher resting NAD noted. DVC maintained.    Render Post-Care Instructions In Note?: yes Detail Level: Detailed Spray Paint Technique: No Spray Paint Text: The liquid nitrogen was applied to the skin utilizing a spray paint frosting technique. Number Of Freeze-Thaw Cycles: 1 freeze-thaw cycle Post-Care Instructions: I reviewed with the patient in detail post-care instructions. Patient is to wear sunprotection, and avoid picking at any of the treated lesions. Pt may apply Aquaphor to crusted or scabbing areas. Medical Necessity Information: It is in your best interest to select a reason for this procedure from the list below. All of these items fulfill various CMS LCD requirements except the new and changing color options. Medical Necessity Clause: This procedure was medically necessary because the lesions that were treated were: Consent: VERBAL consent was obtained including but not limited to risks of crusting, scabbing, blistering, scarring, darker or lighter pigmentary change, recurrence, incomplete removal and infection.

## 2024-08-22 NOTE — PROGRESS NOTES
04/24/19 2000   [facility]   Group Name Community Reintegration   Specific Interventions Current Events   Participation Level Minimal   Participation Quality Cooperative   Insight/Motivation Limited   Affect/Mood Display Appropriate   Cognition Alert      Monika Beck is a 22 year old female presenting with cloudy urine, some cramping that patient states she thought it was related to pregnancy but after seeing labs from her ED visit she thinks she may have a uti. Patient is 9 weeks pregnant at this time  Symptoms started about 1 week ago with the cloudy urine  OTC medications used: none  Denies  known Latex allergy or symptoms of Latex sensitivity.  Social History     Tobacco Use   Smoking Status Never    Passive exposure: Never   Smokeless Tobacco Never     All allergies and medications reviewed.  PCP verified:  Anthony Burton MD  Pharmacy verified:  [facility]  Patient would like communication of their results via:      Cell Phone:   Telephone Information:   Mobile [phone]     Okay to leave a message containing results? Yes